# Patient Record
Sex: FEMALE | Race: WHITE | NOT HISPANIC OR LATINO | Employment: OTHER | URBAN - METROPOLITAN AREA
[De-identification: names, ages, dates, MRNs, and addresses within clinical notes are randomized per-mention and may not be internally consistent; named-entity substitution may affect disease eponyms.]

---

## 2017-12-28 ENCOUNTER — HOSPITAL ENCOUNTER (OUTPATIENT)
Dept: RADIOLOGY | Facility: MEDICAL CENTER | Age: 67
End: 2017-12-28
Attending: SPECIALIST
Payer: MEDICARE

## 2017-12-28 DIAGNOSIS — Z12.31 VISIT FOR SCREENING MAMMOGRAM: ICD-10-CM

## 2017-12-28 PROCEDURE — G0202 SCR MAMMO BI INCL CAD: HCPCS

## 2018-07-26 ENCOUNTER — APPOINTMENT (RX ONLY)
Dept: URBAN - METROPOLITAN AREA CLINIC 4 | Facility: CLINIC | Age: 68
Setting detail: DERMATOLOGY
End: 2018-07-26

## 2018-07-26 DIAGNOSIS — L82.0 INFLAMED SEBORRHEIC KERATOSIS: ICD-10-CM

## 2018-07-26 DIAGNOSIS — D18.0 HEMANGIOMA: ICD-10-CM

## 2018-07-26 DIAGNOSIS — L57.8 OTHER SKIN CHANGES DUE TO CHRONIC EXPOSURE TO NONIONIZING RADIATION: ICD-10-CM

## 2018-07-26 DIAGNOSIS — D22 MELANOCYTIC NEVI: ICD-10-CM

## 2018-07-26 DIAGNOSIS — Z85.828 PERSONAL HISTORY OF OTHER MALIGNANT NEOPLASM OF SKIN: ICD-10-CM

## 2018-07-26 DIAGNOSIS — L81.4 OTHER MELANIN HYPERPIGMENTATION: ICD-10-CM

## 2018-07-26 DIAGNOSIS — L82.1 OTHER SEBORRHEIC KERATOSIS: ICD-10-CM

## 2018-07-26 PROBLEM — D18.01 HEMANGIOMA OF SKIN AND SUBCUTANEOUS TISSUE: Status: ACTIVE | Noted: 2018-07-26

## 2018-07-26 PROBLEM — D22.5 MELANOCYTIC NEVI OF TRUNK: Status: ACTIVE | Noted: 2018-07-26

## 2018-07-26 PROCEDURE — 99202 OFFICE O/P NEW SF 15 MIN: CPT | Mod: 25

## 2018-07-26 PROCEDURE — 17110 DESTRUCTION B9 LES UP TO 14: CPT

## 2018-07-26 PROCEDURE — ? LIQUID NITROGEN

## 2018-07-26 PROCEDURE — ? COUNSELING

## 2018-07-26 ASSESSMENT — LOCATION SIMPLE DESCRIPTION DERM
LOCATION SIMPLE: RIGHT ANTERIOR NECK
LOCATION SIMPLE: LEFT UPPER BACK
LOCATION SIMPLE: LEFT LOWER BACK
LOCATION SIMPLE: LEFT HAND
LOCATION SIMPLE: RIGHT CHEEK
LOCATION SIMPLE: RIGHT HAND
LOCATION SIMPLE: RIGHT UPPER BACK
LOCATION SIMPLE: LEFT POSTERIOR UPPER ARM

## 2018-07-26 ASSESSMENT — LOCATION DETAILED DESCRIPTION DERM
LOCATION DETAILED: LEFT RADIAL DORSAL HAND
LOCATION DETAILED: LEFT INFERIOR MEDIAL MIDBACK
LOCATION DETAILED: RIGHT INFERIOR MEDIAL UPPER BACK
LOCATION DETAILED: RIGHT INFERIOR ANTERIOR NECK
LOCATION DETAILED: LEFT PROXIMAL POSTERIOR UPPER ARM
LOCATION DETAILED: RIGHT RADIAL DORSAL HAND
LOCATION DETAILED: RIGHT INFERIOR CENTRAL MALAR CHEEK
LOCATION DETAILED: RIGHT SUPERIOR UPPER BACK
LOCATION DETAILED: LEFT SUPERIOR UPPER BACK

## 2018-07-26 ASSESSMENT — LOCATION ZONE DERM
LOCATION ZONE: TRUNK
LOCATION ZONE: ARM
LOCATION ZONE: HAND
LOCATION ZONE: NECK
LOCATION ZONE: FACE

## 2018-07-26 NOTE — PROCEDURE: LIQUID NITROGEN
Consent: The patient's consent was obtained including but not limited to risks of crusting, scabbing, blistering, scarring, darker or lighter pigmentary change, recurrence, incomplete removal and infection.
Aperture Size (Optional): C
Detail Level: Detailed
Post-Care Instructions: I reviewed with the patient in detail post-care instructions. Patient is to wear sunprotection, and avoid picking at any of the treated lesions. Pt may apply Vaseline to crusted or scabbing areas.
Add 52 Modifier (Optional): no
Number Of Freeze-Thaw Cycles: 1 freeze-thaw cycle
Medical Necessity Information: It is in your best interest to select a reason for this procedure from the list below. All of these items fulfill various CMS LCD requirements except the new and changing color options.
Medical Necessity Clause: This procedure was medically necessary because the lesions that were treated were:
Duration Of Freeze Thaw-Cycle (Seconds): 3

## 2019-07-31 ENCOUNTER — HOSPITAL ENCOUNTER (OUTPATIENT)
Dept: RADIOLOGY | Facility: MEDICAL CENTER | Age: 69
End: 2019-07-31
Attending: INTERNAL MEDICINE
Payer: MEDICARE

## 2019-07-31 DIAGNOSIS — Z13.6 SCREENING FOR ABDOMINAL AORTIC ANEURYSM: ICD-10-CM

## 2019-07-31 DIAGNOSIS — I65.29 STENOSIS OF CAROTID ARTERY, UNSPECIFIED LATERALITY: ICD-10-CM

## 2019-07-31 PROCEDURE — 93978 VASCULAR STUDY: CPT

## 2019-07-31 PROCEDURE — 93880 EXTRACRANIAL BILAT STUDY: CPT | Mod: 26 | Performed by: INTERNAL MEDICINE

## 2019-07-31 PROCEDURE — 93880 EXTRACRANIAL BILAT STUDY: CPT

## 2019-07-31 PROCEDURE — 93978 VASCULAR STUDY: CPT | Mod: 26 | Performed by: INTERNAL MEDICINE

## 2019-08-20 ENCOUNTER — APPOINTMENT (RX ONLY)
Dept: URBAN - METROPOLITAN AREA CLINIC 4 | Facility: CLINIC | Age: 69
Setting detail: DERMATOLOGY
End: 2019-08-20

## 2019-08-20 DIAGNOSIS — Z85.828 PERSONAL HISTORY OF OTHER MALIGNANT NEOPLASM OF SKIN: ICD-10-CM

## 2019-08-20 DIAGNOSIS — L98.8 OTHER SPECIFIED DISORDERS OF THE SKIN AND SUBCUTANEOUS TISSUE: ICD-10-CM

## 2019-08-20 DIAGNOSIS — D18.0 HEMANGIOMA: ICD-10-CM

## 2019-08-20 DIAGNOSIS — L57.8 OTHER SKIN CHANGES DUE TO CHRONIC EXPOSURE TO NONIONIZING RADIATION: ICD-10-CM

## 2019-08-20 DIAGNOSIS — L57.0 ACTINIC KERATOSIS: ICD-10-CM

## 2019-08-20 DIAGNOSIS — L82.0 INFLAMED SEBORRHEIC KERATOSIS: ICD-10-CM

## 2019-08-20 DIAGNOSIS — L81.4 OTHER MELANIN HYPERPIGMENTATION: ICD-10-CM

## 2019-08-20 DIAGNOSIS — D22 MELANOCYTIC NEVI: ICD-10-CM

## 2019-08-20 DIAGNOSIS — L82.1 OTHER SEBORRHEIC KERATOSIS: ICD-10-CM

## 2019-08-20 PROBLEM — M12.9 ARTHROPATHY, UNSPECIFIED: Status: ACTIVE | Noted: 2019-08-20

## 2019-08-20 PROBLEM — D22.5 MELANOCYTIC NEVI OF TRUNK: Status: ACTIVE | Noted: 2019-08-20

## 2019-08-20 PROBLEM — D18.01 HEMANGIOMA OF SKIN AND SUBCUTANEOUS TISSUE: Status: ACTIVE | Noted: 2019-08-20

## 2019-08-20 PROCEDURE — ? ADDITIONAL NOTES

## 2019-08-20 PROCEDURE — 17003 DESTRUCT PREMALG LES 2-14: CPT | Mod: 59

## 2019-08-20 PROCEDURE — 17000 DESTRUCT PREMALG LESION: CPT | Mod: 59

## 2019-08-20 PROCEDURE — 17110 DESTRUCTION B9 LES UP TO 14: CPT

## 2019-08-20 PROCEDURE — 99214 OFFICE O/P EST MOD 30 MIN: CPT | Mod: 25

## 2019-08-20 PROCEDURE — ? LIQUID NITROGEN

## 2019-08-20 PROCEDURE — ? COUNSELING

## 2019-08-20 ASSESSMENT — LOCATION DETAILED DESCRIPTION DERM
LOCATION DETAILED: LEFT NASAL DORSUM
LOCATION DETAILED: RIGHT RADIAL DORSAL HAND
LOCATION DETAILED: RIGHT INFERIOR CENTRAL MALAR CHEEK
LOCATION DETAILED: RIGHT SUPERIOR UPPER BACK
LOCATION DETAILED: NASAL DORSUM
LOCATION DETAILED: LEFT SUPERIOR UPPER BACK
LOCATION DETAILED: LEFT POSTERIOR AXILLA
LOCATION DETAILED: RIGHT INFERIOR MEDIAL UPPER BACK
LOCATION DETAILED: RIGHT INFERIOR VERMILION LIP
LOCATION DETAILED: LEFT RADIAL DORSAL HAND
LOCATION DETAILED: UPPER STERNUM
LOCATION DETAILED: LEFT RIB CAGE
LOCATION DETAILED: RIGHT INFERIOR ANTERIOR NECK

## 2019-08-20 ASSESSMENT — LOCATION ZONE DERM
LOCATION ZONE: FACE
LOCATION ZONE: NECK
LOCATION ZONE: NOSE
LOCATION ZONE: LIP
LOCATION ZONE: AXILLAE
LOCATION ZONE: HAND
LOCATION ZONE: TRUNK

## 2019-08-20 ASSESSMENT — LOCATION SIMPLE DESCRIPTION DERM
LOCATION SIMPLE: RIGHT UPPER BACK
LOCATION SIMPLE: ABDOMEN
LOCATION SIMPLE: LEFT HAND
LOCATION SIMPLE: CHEST
LOCATION SIMPLE: NOSE
LOCATION SIMPLE: RIGHT HAND
LOCATION SIMPLE: LEFT UPPER BACK
LOCATION SIMPLE: LEFT POSTERIOR AXILLA
LOCATION SIMPLE: RIGHT ANTERIOR NECK
LOCATION SIMPLE: RIGHT LIP
LOCATION SIMPLE: RIGHT CHEEK

## 2019-08-20 NOTE — PROCEDURE: LIQUID NITROGEN
Detail Level: Detailed
Render Note In Bullet Format When Appropriate: No
Aperture Size (Optional): C
Post-Care Instructions: I reviewed with the patient in detail post-care instructions. Patient is to wear sunprotection, and avoid picking at any of the treated lesions. Pt may apply Vaseline to crusted or scabbing areas.
Duration Of Freeze Thaw-Cycle (Seconds): 3
Render Post-Care Instructions In Note?: yes
Number Of Freeze-Thaw Cycles: 2 freeze-thaw cycles
Consent: The patient's consent was obtained including but not limited to risks of crusting, scabbing, blistering, scarring, darker or lighter pigmentary change, recurrence, incomplete removal and infection.
Medical Necessity Clause: This procedure was medically necessary because the lesions that were treated were:
Medical Necessity Information: It is in your best interest to select a reason for this procedure from the list below. All of these items fulfill various CMS LCD requirements except the new and changing color options.
Number Of Freeze-Thaw Cycles: 1 freeze-thaw cycle

## 2019-09-09 ENCOUNTER — HOSPITAL ENCOUNTER (OUTPATIENT)
Dept: RADIOLOGY | Facility: MEDICAL CENTER | Age: 69
End: 2019-09-09
Attending: INTERNAL MEDICINE
Payer: MEDICARE

## 2019-09-09 DIAGNOSIS — Z12.31 SCREENING MAMMOGRAM, ENCOUNTER FOR: ICD-10-CM

## 2019-09-09 PROCEDURE — 77063 BREAST TOMOSYNTHESIS BI: CPT

## 2019-11-08 ENCOUNTER — HOSPITAL ENCOUNTER (OUTPATIENT)
Dept: LAB | Facility: MEDICAL CENTER | Age: 69
End: 2019-11-08
Attending: INTERNAL MEDICINE
Payer: MEDICARE

## 2019-11-08 LAB
ALBUMIN SERPL BCP-MCNC: 4.3 G/DL (ref 3.2–4.9)
ALBUMIN/GLOB SERPL: 1.6 G/DL
ALP SERPL-CCNC: 71 U/L (ref 30–99)
ALT SERPL-CCNC: 15 U/L (ref 2–50)
ANION GAP SERPL CALC-SCNC: 7 MMOL/L (ref 0–11.9)
AST SERPL-CCNC: 17 U/L (ref 12–45)
BILIRUB SERPL-MCNC: 0.8 MG/DL (ref 0.1–1.5)
BUN SERPL-MCNC: 24 MG/DL (ref 8–22)
CALCIUM SERPL-MCNC: 9.3 MG/DL (ref 8.5–10.5)
CHLORIDE SERPL-SCNC: 103 MMOL/L (ref 96–112)
CHOLEST SERPL-MCNC: 189 MG/DL (ref 100–199)
CO2 SERPL-SCNC: 29 MMOL/L (ref 20–33)
CREAT SERPL-MCNC: 0.95 MG/DL (ref 0.5–1.4)
EST. AVERAGE GLUCOSE BLD GHB EST-MCNC: 105 MG/DL
GLOBULIN SER CALC-MCNC: 2.7 G/DL (ref 1.9–3.5)
GLUCOSE SERPL-MCNC: 87 MG/DL (ref 65–99)
HBA1C MFR BLD: 5.3 % (ref 0–5.6)
HDLC SERPL-MCNC: 56 MG/DL
LDLC SERPL CALC-MCNC: 116 MG/DL
POTASSIUM SERPL-SCNC: 4.8 MMOL/L (ref 3.6–5.5)
PROT SERPL-MCNC: 7 G/DL (ref 6–8.2)
SODIUM SERPL-SCNC: 139 MMOL/L (ref 135–145)
TRIGL SERPL-MCNC: 85 MG/DL (ref 0–149)

## 2019-11-08 PROCEDURE — 83036 HEMOGLOBIN GLYCOSYLATED A1C: CPT

## 2019-11-08 PROCEDURE — 80053 COMPREHEN METABOLIC PANEL: CPT

## 2019-11-08 PROCEDURE — 80061 LIPID PANEL: CPT

## 2020-02-19 ENCOUNTER — APPOINTMENT (RX ONLY)
Dept: URBAN - METROPOLITAN AREA CLINIC 4 | Facility: CLINIC | Age: 70
Setting detail: DERMATOLOGY
End: 2020-02-19

## 2020-02-19 DIAGNOSIS — L81.4 OTHER MELANIN HYPERPIGMENTATION: ICD-10-CM

## 2020-02-19 DIAGNOSIS — L57.0 ACTINIC KERATOSIS: ICD-10-CM

## 2020-02-19 DIAGNOSIS — Z41.9 ENCOUNTER FOR PROCEDURE FOR PURPOSES OTHER THAN REMEDYING HEALTH STATE, UNSPECIFIED: ICD-10-CM

## 2020-02-19 DIAGNOSIS — L82.1 OTHER SEBORRHEIC KERATOSIS: ICD-10-CM

## 2020-02-19 DIAGNOSIS — L98.8 OTHER SPECIFIED DISORDERS OF THE SKIN AND SUBCUTANEOUS TISSUE: ICD-10-CM

## 2020-02-19 DIAGNOSIS — D22 MELANOCYTIC NEVI: ICD-10-CM

## 2020-02-19 DIAGNOSIS — D18.0 HEMANGIOMA: ICD-10-CM

## 2020-02-19 DIAGNOSIS — L82.0 INFLAMED SEBORRHEIC KERATOSIS: ICD-10-CM

## 2020-02-19 DIAGNOSIS — L57.8 OTHER SKIN CHANGES DUE TO CHRONIC EXPOSURE TO NONIONIZING RADIATION: ICD-10-CM

## 2020-02-19 DIAGNOSIS — Z85.828 PERSONAL HISTORY OF OTHER MALIGNANT NEOPLASM OF SKIN: ICD-10-CM

## 2020-02-19 DIAGNOSIS — L70.8 OTHER ACNE: ICD-10-CM

## 2020-02-19 PROBLEM — D48.5 NEOPLASM OF UNCERTAIN BEHAVIOR OF SKIN: Status: ACTIVE | Noted: 2020-02-19

## 2020-02-19 PROBLEM — D18.01 HEMANGIOMA OF SKIN AND SUBCUTANEOUS TISSUE: Status: ACTIVE | Noted: 2020-02-19

## 2020-02-19 PROBLEM — D22.5 MELANOCYTIC NEVI OF TRUNK: Status: ACTIVE | Noted: 2020-02-19

## 2020-02-19 PROCEDURE — 99213 OFFICE O/P EST LOW 20 MIN: CPT | Mod: 25

## 2020-02-19 PROCEDURE — 17000 DESTRUCT PREMALG LESION: CPT | Mod: 59

## 2020-02-19 PROCEDURE — 17110 DESTRUCTION B9 LES UP TO 14: CPT

## 2020-02-19 PROCEDURE — 11102 TANGNTL BX SKIN SINGLE LES: CPT | Mod: 59

## 2020-02-19 PROCEDURE — ? PRESCRIPTION

## 2020-02-19 PROCEDURE — 17003 DESTRUCT PREMALG LES 2-14: CPT | Mod: 59

## 2020-02-19 PROCEDURE — ? COUNSELING

## 2020-02-19 PROCEDURE — ? ADDITIONAL NOTES

## 2020-02-19 PROCEDURE — ? BIOPSY BY SHAVE METHOD

## 2020-02-19 PROCEDURE — ? PHOTO-DOCUMENTATION

## 2020-02-19 PROCEDURE — ? LIQUID NITROGEN

## 2020-02-19 RX ORDER — TRETIONIN 0.25 MG/G
CREAM TOPICAL
Qty: 1 | Refills: 3 | Status: ERX | COMMUNITY
Start: 2020-02-19

## 2020-02-19 RX ADMIN — TRETIONIN 1: 0.25 CREAM TOPICAL at 00:00

## 2020-02-19 ASSESSMENT — LOCATION SIMPLE DESCRIPTION DERM
LOCATION SIMPLE: LEFT UPPER BACK
LOCATION SIMPLE: RIGHT ANTERIOR NECK
LOCATION SIMPLE: RIGHT LIP
LOCATION SIMPLE: RIGHT UPPER BACK
LOCATION SIMPLE: RIGHT HAND
LOCATION SIMPLE: CHEST
LOCATION SIMPLE: LEFT CHEEK
LOCATION SIMPLE: RIGHT BREAST
LOCATION SIMPLE: RIGHT CHEEK
LOCATION SIMPLE: LEFT HAND

## 2020-02-19 ASSESSMENT — LOCATION DETAILED DESCRIPTION DERM
LOCATION DETAILED: RIGHT INFERIOR CENTRAL MALAR CHEEK
LOCATION DETAILED: RIGHT SUPERIOR LATERAL BUCCAL CHEEK
LOCATION DETAILED: LEFT SUPERIOR UPPER BACK
LOCATION DETAILED: RIGHT MEDIAL SUPERIOR CHEST
LOCATION DETAILED: RIGHT INFERIOR LATERAL MALAR CHEEK
LOCATION DETAILED: LEFT CENTRAL MALAR CHEEK
LOCATION DETAILED: LEFT RADIAL DORSAL HAND
LOCATION DETAILED: RIGHT INFERIOR MEDIAL UPPER BACK
LOCATION DETAILED: RIGHT MEDIAL BREAST 1-2:00 REGION
LOCATION DETAILED: MIDDLE STERNUM
LOCATION DETAILED: RIGHT LATERAL MALAR CHEEK
LOCATION DETAILED: LEFT MEDIAL SUPERIOR CHEST
LOCATION DETAILED: RIGHT SUPERIOR UPPER BACK
LOCATION DETAILED: RIGHT RADIAL DORSAL HAND
LOCATION DETAILED: LEFT INFERIOR CENTRAL MALAR CHEEK
LOCATION DETAILED: UPPER STERNUM
LOCATION DETAILED: RIGHT INFERIOR VERMILION LIP
LOCATION DETAILED: RIGHT INFERIOR ANTERIOR NECK

## 2020-02-19 ASSESSMENT — LOCATION ZONE DERM
LOCATION ZONE: NECK
LOCATION ZONE: TRUNK
LOCATION ZONE: LIP
LOCATION ZONE: HAND
LOCATION ZONE: FACE

## 2020-02-19 NOTE — PROCEDURE: LIQUID NITROGEN
Detail Level: Simple
Render Post-Care Instructions In Note?: no
Aperture Size (Optional): C
Duration Of Freeze Thaw-Cycle (Seconds): 3
Number Of Freeze-Thaw Cycles: 2 freeze-thaw cycles
Post-Care Instructions: I reviewed with the patient in detail post-care instructions. Patient is to wear sunprotection, and avoid picking at any of the treated lesions. Pt may apply Vaseline to crusted or scabbing areas.
Consent: The patient's consent was obtained including but not limited to risks of crusting, scabbing, blistering, scarring, darker or lighter pigmentary change, recurrence, incomplete removal and infection.
Medical Necessity Clause: This procedure was medically necessary because the lesions that were treated were:
Number Of Freeze-Thaw Cycles: 1 freeze-thaw cycle
Medical Necessity Information: It is in your best interest to select a reason for this procedure from the list below. All of these items fulfill various CMS LCD requirements except the new and changing color options.
Detail Level: Detailed

## 2020-02-19 NOTE — PROCEDURE: ADDITIONAL NOTES
Additional Notes: Discussed KANWAL Irwin
Detail Level: Simple
Additional Notes: Discuss VBeam treatment

## 2020-08-19 ENCOUNTER — APPOINTMENT (RX ONLY)
Dept: URBAN - METROPOLITAN AREA CLINIC 4 | Facility: CLINIC | Age: 70
Setting detail: DERMATOLOGY
End: 2020-08-19

## 2020-08-19 DIAGNOSIS — Z85.828 PERSONAL HISTORY OF OTHER MALIGNANT NEOPLASM OF SKIN: ICD-10-CM

## 2020-08-19 DIAGNOSIS — D18.0 HEMANGIOMA: ICD-10-CM

## 2020-08-19 DIAGNOSIS — D22 MELANOCYTIC NEVI: ICD-10-CM

## 2020-08-19 DIAGNOSIS — L57.0 ACTINIC KERATOSIS: ICD-10-CM

## 2020-08-19 DIAGNOSIS — L82.1 OTHER SEBORRHEIC KERATOSIS: ICD-10-CM

## 2020-08-19 DIAGNOSIS — L57.8 OTHER SKIN CHANGES DUE TO CHRONIC EXPOSURE TO NONIONIZING RADIATION: ICD-10-CM

## 2020-08-19 DIAGNOSIS — L81.4 OTHER MELANIN HYPERPIGMENTATION: ICD-10-CM

## 2020-08-19 DIAGNOSIS — M71 OTHER BURSOPATHIES: ICD-10-CM

## 2020-08-19 PROBLEM — M71.342 OTHER BURSAL CYST, LEFT HAND: Status: ACTIVE | Noted: 2020-08-19

## 2020-08-19 PROBLEM — D22.5 MELANOCYTIC NEVI OF TRUNK: Status: ACTIVE | Noted: 2020-08-19

## 2020-08-19 PROBLEM — D18.01 HEMANGIOMA OF SKIN AND SUBCUTANEOUS TISSUE: Status: ACTIVE | Noted: 2020-08-19

## 2020-08-19 PROCEDURE — 99213 OFFICE O/P EST LOW 20 MIN: CPT | Mod: 25

## 2020-08-19 PROCEDURE — 17000 DESTRUCT PREMALG LESION: CPT

## 2020-08-19 PROCEDURE — ? COUNSELING

## 2020-08-19 PROCEDURE — ? LIQUID NITROGEN

## 2020-08-19 ASSESSMENT — LOCATION DETAILED DESCRIPTION DERM
LOCATION DETAILED: LEFT SUPERIOR UPPER BACK
LOCATION DETAILED: LEFT DISTAL DORSAL RING FINGER
LOCATION DETAILED: RIGHT CENTRAL MALAR CHEEK
LOCATION DETAILED: RIGHT INFERIOR CENTRAL MALAR CHEEK
LOCATION DETAILED: RIGHT INFERIOR ANTERIOR NECK
LOCATION DETAILED: LEFT RADIAL DORSAL HAND
LOCATION DETAILED: RIGHT SUPERIOR UPPER BACK
LOCATION DETAILED: RIGHT INFERIOR MEDIAL UPPER BACK
LOCATION DETAILED: RIGHT RADIAL DORSAL HAND

## 2020-08-19 ASSESSMENT — LOCATION ZONE DERM
LOCATION ZONE: NECK
LOCATION ZONE: HAND
LOCATION ZONE: FINGER
LOCATION ZONE: TRUNK
LOCATION ZONE: FACE

## 2020-08-19 ASSESSMENT — LOCATION SIMPLE DESCRIPTION DERM
LOCATION SIMPLE: RIGHT CHEEK
LOCATION SIMPLE: LEFT HAND
LOCATION SIMPLE: RIGHT UPPER BACK
LOCATION SIMPLE: LEFT UPPER BACK
LOCATION SIMPLE: LEFT RING FINGER
LOCATION SIMPLE: RIGHT HAND
LOCATION SIMPLE: RIGHT ANTERIOR NECK

## 2020-08-19 NOTE — PROCEDURE: LIQUID NITROGEN
Detail Level: Simple
Render Post-Care Instructions In Note?: no
Post-Care Instructions: I reviewed with the patient in detail post-care instructions. Patient is to wear sunprotection, and avoid picking at any of the treated lesions. Pt may apply Vaseline to crusted or scabbing areas.
Consent: The patient's consent was obtained including but not limited to risks of crusting, scabbing, blistering, scarring, darker or lighter pigmentary change, recurrence, incomplete removal and infection.
Aperture Size (Optional): C
Duration Of Freeze Thaw-Cycle (Seconds): 3
Number Of Freeze-Thaw Cycles: 2 freeze-thaw cycles

## 2020-08-31 ENCOUNTER — OFFICE VISIT (OUTPATIENT)
Dept: CARDIOLOGY | Facility: MEDICAL CENTER | Age: 70
End: 2020-08-31
Payer: MEDICARE

## 2020-08-31 VITALS
SYSTOLIC BLOOD PRESSURE: 130 MMHG | BODY MASS INDEX: 24.16 KG/M2 | OXYGEN SATURATION: 96 % | HEART RATE: 66 BPM | DIASTOLIC BLOOD PRESSURE: 70 MMHG | HEIGHT: 65 IN | WEIGHT: 145 LBS

## 2020-08-31 DIAGNOSIS — R00.2 PALPITATIONS: ICD-10-CM

## 2020-08-31 LAB — EKG IMPRESSION: NORMAL

## 2020-08-31 PROCEDURE — 99204 OFFICE O/P NEW MOD 45 MIN: CPT | Performed by: INTERNAL MEDICINE

## 2020-08-31 PROCEDURE — 93000 ELECTROCARDIOGRAM COMPLETE: CPT | Performed by: INTERNAL MEDICINE

## 2020-08-31 RX ORDER — FAMOTIDINE 20 MG/1
20 TABLET, FILM COATED ORAL 2 TIMES DAILY
COMMUNITY

## 2020-08-31 RX ORDER — CITALOPRAM HYDROBROMIDE 10 MG/1
10 TABLET ORAL DAILY
COMMUNITY
End: 2022-01-01

## 2020-08-31 NOTE — PROGRESS NOTES
Arrhythmia Clinic Note (New patient)     DOS: 8/31/2020    Referring physician: Dr Latham    Chief complaint/Reason for consult: Palpitations    HPI: 70-year-old female with a history of anxiety presenting for evaluation of palpitations.  She says that once in a while, she will feel onset of rapid palpitations which will last seconds to minutes.  This happens often in the middle the night and awakes her from sleep.  She denies chest pain, shortness of breath on exertion.  She denies syncope or presyncope.  She does have significant anxiety.  She has gastroesophageal reflux disease as well.  She is wondering if her reflux is causing these palpitations.  She is also wondering if her prior history of gallbladder removal could absolutely do with this.  She is saying that she has a strong family history of good heart health, but is very concerned if she has something that she should worry about.  She was told she had a murmur many years ago as well.    ROS (+ highlighted in bold):  Constitutional: Fevers/chills/fatigue/weightloss  HEENT: Blurry vision/eye pain/sore throat/hearing loss  Respiratory: Shortness of breath/cough  Cardiovascular: Chest pain/palpitations/edema/orthopnea/syncope  GI: Nausea/vomitting/diarrhea  MSK: Arthralgias/myagias/muscle weakness  Skin: Rash/sores  Neurological: Numbness/tremors/vertigo  Endocrine: Excessive thirst/polyuria/cold intolerance/heat intolerance  Psych: Depression/anxiety    Past Medical History:   Diagnosis Date   • Arrhythmia    • Cancer (HCC)     squamous cell carcinoma removed   • Heart murmur        Past Surgical History:   Procedure Laterality Date   • HYSTERECTOMY ROBOTIC  11/12/2009    Performed by GEORGES CRUMP at SURGERY [facility]S   • CYSTOSCOPY  11/12/2009    Performed by GEORGES CRUMP at SURGERY [facility]   • CHOLECYSTECTOMY     • OTHER ORTHOPEDIC SURGERY      rt ankle fx       Social History     Socioeconomic History   • Marital status: Single      Spouse name: Not on file   • Number of children: Not on file   • Years of education: Not on file   • Highest education level: Not on file   Occupational History   • Not on file   Social Needs   • Financial resource strain: Not on file   • Food insecurity     Worry: Not on file     Inability: Not on file   • Transportation needs     Medical: Not on file     Non-medical: Not on file   Tobacco Use   • Smoking status: Former Smoker   • Smokeless tobacco: Never Used   • Tobacco comment: 1 ppd, 10 yrs   Substance and Sexual Activity   • Alcohol use: Yes   • Drug use: No   • Sexual activity: Not on file   Lifestyle   • Physical activity     Days per week: Not on file     Minutes per session: Not on file   • Stress: Not on file   Relationships   • Social connections     Talks on phone: Not on file     Gets together: Not on file     Attends [employer] service: Not on file     Active member of club or organization: Not on file     Attends meetings of clubs or organizations: Not on file     Relationship status: Not on file   • Intimate partner violence     Fear of current or ex partner: Not on file     Emotionally abused: Not on file     Physically abused: Not on file     Forced sexual activity: Not on file   Other Topics Concern   • Not on file   Social History Narrative   • Not on file       No family history on file.   No family history of sudden cardiac death, father lived to 100 years old    No Known Allergies    Current Outpatient Medications   Medication Sig Dispense Refill   • citalopram (CELEXA) 10 MG tablet Take 10 mg by mouth every day.     • famotidine (PEPCID) 20 MG Tab Take 20 mg by mouth 2 times a day.     • oxycodone immediate-release (ROXICODONE) 5 MG Tab Take 1 Tab by mouth every four hours as needed. (Patient not taking: Reported on 8/31/2020) 30 Tab 0   • ondansetron (ZOFRAN ODT) 4 MG TABLET DISPERSIBLE Take 1 Tab by mouth every four hours as needed for Nausea/Vomiting. (Patient not taking: Reported on  "8/31/2020) 30 Tab 0   • guaifenesin LA (MUCINEX) 600 MG TABLET SR 12 HR Take 1 Tab by mouth every 12 hours. (Patient not taking: Reported on 8/31/2020) 30 Tab 0   • guaifenesin DM (ROBITUSSIN DM) 100-10 MG/5ML Syrup Take 10 mL by mouth every 6 hours as needed for Cough. (Patient not taking: Reported on 8/31/2020) 560 mL 1   • Calcium-Vitamin D-Vitamin K (CALCIUM + D + K PO) Take  by mouth.       No current facility-administered medications for this visit.        Physical Exam:  Vitals:    08/31/20 0926   BP: 130/70   BP Location: Left arm   Patient Position: Sitting   BP Cuff Size: Adult   Pulse: 66   SpO2: 96%   Weight: 65.8 kg (145 lb)   Height: 1.651 m (5' 5\")     General appearance: NAD, conversant   Eyes: anicteric sclerae, moist conjunctivae; no lid-lag; PERRLA  HENT: Atraumatic; oropharynx clear with moist mucous membranes and no mucosal ulcerations; normal hard and soft palate  Neck: Trachea midline; FROM, supple, no thyromegaly or lymphadenopathy  Lungs: CTA, with normal respiratory effort and no intercostal retractions  CV: RRR, no MRGs, no JVD   Abdomen: Soft, non-tender; no masses or HSM  Extremities: No peripheral edema or extremity lymphadenopathy  Skin: Normal temperature, turgor and texture; no rash, ulcers or subcutaneous nodules  Psych: Appropriate affect, alert and oriented to person, place and time    Data:  Lipids:   Lab Results   Component Value Date/Time    CHOLSTRLTOT 189 11/08/2019 09:30 AM    TRIGLYCERIDE 85 11/08/2019 09:30 AM    HDL 56 11/08/2019 09:30 AM     (H) 11/08/2019 09:30 AM        BMP:  Lab Results   Component Value Date/Time    SODIUM 139 11/08/2019 0930    POTASSIUM 4.8 11/08/2019 0930    CHLORIDE 103 11/08/2019 0930    CO2 29 11/08/2019 0930    GLUCOSE 87 11/08/2019 0930    BUN 24 (H) 11/08/2019 0930    CREATININE 0.95 11/08/2019 0930    CALCIUM 9.3 11/08/2019 0930    ANION 7.0 11/08/2019 0930        TSH:   Lab Results   Component Value Date/Time    TSHULTRASEN 2.170 " 11/08/2015 0230        THYROXINE (T4):   No results found for: NEENAIR     CBC:   Lab Results   Component Value Date/Time    WBC 6.4 11/08/2015 02:30 AM    RBC 3.90 (L) 11/08/2015 02:30 AM    HEMOGLOBIN 11.7 (L) 11/08/2015 02:30 AM    HEMATOCRIT 34.4 (L) 11/08/2015 02:30 AM    MCV 88.2 11/08/2015 02:30 AM    MCH 30.0 11/08/2015 02:30 AM    MCHC 34.0 11/08/2015 02:30 AM    RDW 40.6 11/08/2015 02:30 AM    PLATELETCT 171 11/08/2015 02:30 AM    MPV 10.9 11/08/2015 02:30 AM    NEUTSPOLYS 72.10 (H) 11/07/2015 03:07 AM    LYMPHOCYTES 16.40 (L) 11/07/2015 03:07 AM    MONOCYTES 10.10 11/07/2015 03:07 AM    EOSINOPHILS 0.80 11/07/2015 03:07 AM    BASOPHILS 0.30 11/07/2015 03:07 AM    IMMGRAN 0.30 11/07/2015 03:07 AM    NRBC 0.00 11/07/2015 03:07 AM    NEUTS 5.58 11/07/2015 03:07 AM    LYMPHS 1.27 11/07/2015 03:07 AM    MONOS 0.78 11/07/2015 03:07 AM    EOS 0.06 11/07/2015 03:07 AM    BASO 0.02 11/07/2015 03:07 AM    IMMGRANAB 0.02 11/07/2015 03:07 AM    NRBCAB 0.00 11/07/2015 03:07 AM        CBC w/o DIFF  Lab Results   Component Value Date/Time    WBC 6.4 11/08/2015 02:30 AM    RBC 3.90 (L) 11/08/2015 02:30 AM    HEMOGLOBIN 11.7 (L) 11/08/2015 02:30 AM    MCV 88.2 11/08/2015 02:30 AM    MCH 30.0 11/08/2015 02:30 AM    MCHC 34.0 11/08/2015 02:30 AM    RDW 40.6 11/08/2015 02:30 AM    MPV 10.9 11/08/2015 02:30 AM     Echocardiogram 2009: Normal LV function, mild to moderate mitral valve regurgitation    EKG interpreted by me: Prior EKG in 2017 showed normal sinus rhythm    Impression/Plan:  1.  Palpitations  2.  Mild to moderate mitral regurgitation    -We will order 3-day bio tell to assess for palpitations  -Recheck echocardiogram given history of mild to moderate mitral vegetation 11 years ago    Follow-up as needed pending any abnormal testing results      Darren Hamilton MD  Cardiac Electrophysiology

## 2020-09-23 ENCOUNTER — HOSPITAL ENCOUNTER (OUTPATIENT)
Dept: CARDIOLOGY | Facility: MEDICAL CENTER | Age: 70
End: 2020-09-23
Attending: INTERNAL MEDICINE
Payer: MEDICARE

## 2020-09-23 DIAGNOSIS — R00.2 PALPITATIONS: ICD-10-CM

## 2020-09-23 LAB
LV EJECT FRACT  99904: 55
LV EJECT FRACT MOD 2C 99903: 45.61
LV EJECT FRACT MOD 4C 99902: 55.95
LV EJECT FRACT MOD BP 99901: 48.78

## 2020-09-23 PROCEDURE — 93306 TTE W/DOPPLER COMPLETE: CPT

## 2020-09-23 PROCEDURE — 93306 TTE W/DOPPLER COMPLETE: CPT | Mod: 26 | Performed by: INTERNAL MEDICINE

## 2021-01-04 ENCOUNTER — HOSPITAL ENCOUNTER (OUTPATIENT)
Dept: RADIOLOGY | Facility: MEDICAL CENTER | Age: 71
End: 2021-01-04
Attending: INTERNAL MEDICINE
Payer: MEDICARE

## 2021-01-04 DIAGNOSIS — Z12.31 VISIT FOR SCREENING MAMMOGRAM: ICD-10-CM

## 2021-01-04 PROCEDURE — 77063 BREAST TOMOSYNTHESIS BI: CPT

## 2021-01-12 ENCOUNTER — HOSPITAL ENCOUNTER (OUTPATIENT)
Dept: RADIOLOGY | Facility: MEDICAL CENTER | Age: 71
End: 2021-01-12
Attending: INTERNAL MEDICINE
Payer: MEDICARE

## 2021-01-12 DIAGNOSIS — R92.8 ABNORMAL MAMMOGRAM: ICD-10-CM

## 2021-01-12 PROCEDURE — G0279 TOMOSYNTHESIS, MAMMO: HCPCS | Mod: RT

## 2021-01-15 DIAGNOSIS — Z23 NEED FOR VACCINATION: ICD-10-CM

## 2021-04-05 ENCOUNTER — TELEPHONE (OUTPATIENT)
Dept: CARDIOLOGY | Facility: MEDICAL CENTER | Age: 71
End: 2021-04-05

## 2021-04-05 NOTE — TELEPHONE ENCOUNTER
MC    Patient called and would like to go over the results from her Echo done in September. She would also like to see if Holter monitor is still needed. Please call her at [phone].      Thank you,    Merline MOSER

## 2021-04-05 NOTE — TELEPHONE ENCOUNTER
Pt would like 30 biotel due to occasional palpations and intermitting feeling like indigestion/heart in throat.  Pt scheduled for end of April.   Discussed echo results all questions answer

## 2021-04-28 ENCOUNTER — NON-PROVIDER VISIT (OUTPATIENT)
Dept: CARDIOLOGY | Facility: MEDICAL CENTER | Age: 71
End: 2021-04-28
Payer: MEDICARE

## 2021-04-28 ENCOUNTER — TELEPHONE (OUTPATIENT)
Dept: CARDIOLOGY | Facility: MEDICAL CENTER | Age: 71
End: 2021-04-28

## 2021-04-28 DIAGNOSIS — R00.2 PALPITATIONS: ICD-10-CM

## 2021-04-28 DIAGNOSIS — I47.29 NSVT (NONSUSTAINED VENTRICULAR TACHYCARDIA) (HCC): ICD-10-CM

## 2021-04-28 PROCEDURE — 93268 ECG RECORD/REVIEW: CPT | Performed by: INTERNAL MEDICINE

## 2021-04-28 NOTE — TELEPHONE ENCOUNTER
Patient enrolled in the 30 day Bio-Tel MCT Heart monitoring program per Darren Hamilton MD.  >In-Clinic hookup with Baseline transmitted  >Pending EOS.

## 2021-06-01 DIAGNOSIS — R00.2 PALPITATIONS: ICD-10-CM

## 2021-06-03 NOTE — RESULT ENCOUNTER NOTE
Good news, your monitor did not show any evidence of concerning or sustained arrhythmias, and otherwise looked very normal.

## 2021-06-30 ENCOUNTER — HOSPITAL ENCOUNTER (OUTPATIENT)
Facility: MEDICAL CENTER | Age: 71
End: 2021-06-30
Attending: INTERNAL MEDICINE
Payer: MEDICARE

## 2021-06-30 PROCEDURE — 82274 ASSAY TEST FOR BLOOD FECAL: CPT

## 2021-07-02 ENCOUNTER — HOSPITAL ENCOUNTER (OUTPATIENT)
Dept: RADIOLOGY | Facility: MEDICAL CENTER | Age: 71
End: 2021-07-02
Attending: INTERNAL MEDICINE

## 2021-07-02 DIAGNOSIS — I65.23 ARTERIOSCLEROSIS OF BOTH CAROTID ARTERIES: ICD-10-CM

## 2021-07-05 LAB — IMM ASSAY OCC BLD FITOB: NEGATIVE

## 2021-07-13 ENCOUNTER — TELEPHONE (OUTPATIENT)
Dept: CARDIOLOGY | Facility: MEDICAL CENTER | Age: 71
End: 2021-07-13

## 2021-07-13 NOTE — TELEPHONE ENCOUNTER
CLAY    Pt called regarding 30 day BioTel monitor placed in office on 4/28 and results in on 6/1. Pt states that she never heard back regarding results and is wondering if someone can call to discuss at [phone]    Thank you

## 2021-08-03 ENCOUNTER — PATIENT MESSAGE (OUTPATIENT)
Dept: HEALTH INFORMATION MANAGEMENT | Facility: OTHER | Age: 71
End: 2021-08-03

## 2021-08-12 ENCOUNTER — APPOINTMENT (RX ONLY)
Dept: URBAN - METROPOLITAN AREA CLINIC 4 | Facility: CLINIC | Age: 71
Setting detail: DERMATOLOGY
End: 2021-08-12

## 2021-08-12 DIAGNOSIS — D18.0 HEMANGIOMA: ICD-10-CM

## 2021-08-12 DIAGNOSIS — L57.0 ACTINIC KERATOSIS: ICD-10-CM

## 2021-08-12 DIAGNOSIS — L81.4 OTHER MELANIN HYPERPIGMENTATION: ICD-10-CM

## 2021-08-12 DIAGNOSIS — Z85.828 PERSONAL HISTORY OF OTHER MALIGNANT NEOPLASM OF SKIN: ICD-10-CM

## 2021-08-12 DIAGNOSIS — L57.8 OTHER SKIN CHANGES DUE TO CHRONIC EXPOSURE TO NONIONIZING RADIATION: ICD-10-CM

## 2021-08-12 DIAGNOSIS — L70.8 OTHER ACNE: ICD-10-CM

## 2021-08-12 DIAGNOSIS — L82.1 OTHER SEBORRHEIC KERATOSIS: ICD-10-CM

## 2021-08-12 DIAGNOSIS — D22 MELANOCYTIC NEVI: ICD-10-CM

## 2021-08-12 DIAGNOSIS — L82.0 INFLAMED SEBORRHEIC KERATOSIS: ICD-10-CM

## 2021-08-12 PROBLEM — D22.5 MELANOCYTIC NEVI OF TRUNK: Status: ACTIVE | Noted: 2021-08-12

## 2021-08-12 PROBLEM — D18.01 HEMANGIOMA OF SKIN AND SUBCUTANEOUS TISSUE: Status: ACTIVE | Noted: 2021-08-12

## 2021-08-12 PROCEDURE — 17110 DESTRUCTION B9 LES UP TO 14: CPT

## 2021-08-12 PROCEDURE — 17003 DESTRUCT PREMALG LES 2-14: CPT | Mod: 59

## 2021-08-12 PROCEDURE — ? COUNSELING

## 2021-08-12 PROCEDURE — 17000 DESTRUCT PREMALG LESION: CPT | Mod: 59

## 2021-08-12 PROCEDURE — ? LIQUID NITROGEN

## 2021-08-12 PROCEDURE — 99213 OFFICE O/P EST LOW 20 MIN: CPT | Mod: 25

## 2021-08-12 PROCEDURE — ? PRESCRIPTION

## 2021-08-12 RX ORDER — TRETIONIN 0.25 MG/G
1 CREAM TOPICAL QHS
Qty: 1 | Refills: 3 | Status: ERX | COMMUNITY
Start: 2021-08-12

## 2021-08-12 RX ADMIN — TRETIONIN 1: 0.25 CREAM TOPICAL at 00:00

## 2021-08-12 ASSESSMENT — LOCATION SIMPLE DESCRIPTION DERM
LOCATION SIMPLE: RIGHT CHEEK
LOCATION SIMPLE: CHEST
LOCATION SIMPLE: LEFT UPPER ARM
LOCATION SIMPLE: RIGHT UPPER ARM
LOCATION SIMPLE: RIGHT FOREARM
LOCATION SIMPLE: LEFT THIGH
LOCATION SIMPLE: LEFT CHEEK
LOCATION SIMPLE: LEFT FOREARM
LOCATION SIMPLE: RIGHT KNEE
LOCATION SIMPLE: RIGHT THIGH
LOCATION SIMPLE: RIGHT UPPER BACK
LOCATION SIMPLE: NOSE

## 2021-08-12 ASSESSMENT — LOCATION ZONE DERM
LOCATION ZONE: NOSE
LOCATION ZONE: LEG
LOCATION ZONE: ARM
LOCATION ZONE: TRUNK
LOCATION ZONE: FACE

## 2021-08-12 ASSESSMENT — LOCATION DETAILED DESCRIPTION DERM
LOCATION DETAILED: LEFT INFERIOR CENTRAL MALAR CHEEK
LOCATION DETAILED: LEFT ANTERIOR DISTAL THIGH
LOCATION DETAILED: LEFT MEDIAL SUPERIOR CHEST
LOCATION DETAILED: RIGHT INFERIOR UPPER BACK
LOCATION DETAILED: RIGHT MID-UPPER BACK
LOCATION DETAILED: RIGHT DISTAL DORSAL FOREARM
LOCATION DETAILED: LEFT ANTERIOR PROXIMAL UPPER ARM
LOCATION DETAILED: RIGHT CENTRAL MALAR CHEEK
LOCATION DETAILED: RIGHT ANTERIOR DISTAL THIGH
LOCATION DETAILED: LEFT DISTAL DORSAL FOREARM
LOCATION DETAILED: RIGHT KNEE
LOCATION DETAILED: RIGHT SUPERIOR MEDIAL UPPER BACK
LOCATION DETAILED: RIGHT ANTERIOR PROXIMAL UPPER ARM
LOCATION DETAILED: NASAL SUPRATIP
LOCATION DETAILED: RIGHT INFERIOR MEDIAL MALAR CHEEK

## 2021-08-12 NOTE — PROCEDURE: LIQUID NITROGEN
Render Note In Bullet Format When Appropriate: No
Number Of Freeze-Thaw Cycles: 2 freeze-thaw cycles
Post-Care Instructions: I reviewed with the patient in detail post-care instructions. Patient is to wear sunprotection, and avoid picking at any of the treated lesions. Pt may apply Vaseline to crusted or scabbing areas.
Show Aperture Variable?: Yes
Consent: The patient's consent was obtained including but not limited to risks of crusting, scabbing, blistering, scarring, darker or lighter pigmentary change, recurrence, incomplete removal and infection.
Detail Level: Simple
Aperture Size (Optional): C
Duration Of Freeze Thaw-Cycle (Seconds): 3
Statement Selected
Detail Level: Detailed
Medical Necessity Clause: This procedure was medically necessary because the lesions that were treated were:
Number Of Freeze-Thaw Cycles: 1 freeze-thaw cycle
Medical Necessity Information: It is in your best interest to select a reason for this procedure from the list below. All of these items fulfill various CMS LCD requirements except the new and changing color options.

## 2021-08-12 NOTE — PROCEDURE: COUNSELING
Detail Level: Zone
Detail Level: Detailed
PEDRO PABLO:'[id number]'
Benzoyl Peroxide Counseling: Patient counseled that medicine may cause skin irritation and bleach clothing.  In the event of skin irritation, the patient was advised to reduce the amount of the drug applied or use it less frequently.   The patient verbalized understanding of the proper use and possible adverse effects of benzoyl peroxide.  All of the patient's questions and concerns were addressed.
Sarecycline Pregnancy And Lactation Text: This medication is Pregnancy Category D and not consider safe during pregnancy. It is also excreted in breast milk.
Dapsone Counseling: I discussed with the patient the risks of dapsone including but not limited to hemolytic anemia, agranulocytosis, rashes, methemoglobinemia, kidney failure, peripheral neuropathy, headaches, GI upset, and liver toxicity.  Patients who start dapsone require monitoring including baseline LFTs and weekly CBCs for the first month, then every month thereafter.  The patient verbalized understanding of the proper use and possible adverse effects of dapsone.  All of the patient's questions and concerns were addressed.
Topical Sulfur Applications Pregnancy And Lactation Text: This medication is Pregnancy Category C and has an unknown safety profile during pregnancy. It is unknown if this topical medication is excreted in breast milk.
High Dose Vitamin A Pregnancy And Lactation Text: High dose vitamin A therapy is contraindicated during pregnancy and breast feeding.
High Dose Vitamin A Counseling: Side effects reviewed, pt to contact office should one occur.
Topical Sulfur Applications Counseling: Topical Sulfur Counseling: Patient counseled that this medication may cause skin irritation or allergic reactions.  In the event of skin irritation, the patient was advised to reduce the amount of the drug applied or use it less frequently.   The patient verbalized understanding of the proper use and possible adverse effects of topical sulfur application.  All of the patient's questions and concerns were addressed.
Azithromycin Pregnancy And Lactation Text: This medication is considered safe during pregnancy and is also secreted in breast milk.
Tazorac Counseling:  Patient advised that medication is irritating and drying.  Patient may need to apply sparingly and wash off after an hour before eventually leaving it on overnight.  The patient verbalized understanding of the proper use and possible adverse effects of tazorac.  All of the patient's questions and concerns were addressed.
Erythromycin Counseling:  I discussed with the patient the risks of erythromycin including but not limited to GI upset, allergic reaction, drug rash, diarrhea, increase in liver enzymes, and yeast infections.
Minocycline Counseling: Patient advised regarding possible photosensitivity and discoloration of the teeth, skin, lips, tongue and gums.  Patient instructed to avoid sunlight, if possible.  When exposed to sunlight, patients should wear protective clothing, sunglasses, and sunscreen.  The patient was instructed to call the office immediately if the following severe adverse effects occur:  hearing changes, easy bruising/bleeding, severe headache, or vision changes.  The patient verbalized understanding of the proper use and possible adverse effects of minocycline.  All of the patient's questions and concerns were addressed.
Benzoyl Peroxide Pregnancy And Lactation Text: This medication is Pregnancy Category C. It is unknown if benzoyl peroxide is excreted in breast milk.
Isotretinoin Counseling: Patient should get monthly blood tests, not donate blood, not drive at night if vision affected, not share medication, and not undergo elective surgery for 6 months after tx completed. Side effects reviewed, pt to contact office should one occur.
Dapsone Pregnancy And Lactation Text: This medication is Pregnancy Category C and is not considered safe during pregnancy or breast feeding.
Erythromycin Pregnancy And Lactation Text: This medication is Pregnancy Category B and is considered safe during pregnancy. It is also excreted in breast milk.
Tazorac Pregnancy And Lactation Text: This medication is not safe during pregnancy. It is unknown if this medication is excreted in breast milk.
Include Pregnancy/Lactation Warning?: No
Spironolactone Counseling: Patient advised regarding risks of diarrhea, abdominal pain, hyperkalemia, birth defects (for female patients), liver toxicity and renal toxicity. The patient may need blood work to monitor liver and kidney function and potassium levels while on therapy. The patient verbalized understanding of the proper use and possible adverse effects of spironolactone.  All of the patient's questions and concerns were addressed.
Bactrim Counseling:  I discussed with the patient the risks of sulfa antibiotics including but not limited to GI upset, allergic reaction, drug rash, diarrhea, dizziness, photosensitivity, and yeast infections.  Rarely, more serious reactions can occur including but not limited to aplastic anemia, agranulocytosis, methemoglobinemia, blood dyscrasias, liver or kidney failure, lung infiltrates or desquamative/blistering drug rashes.
Doxycycline Counseling:  Patient counseled regarding possible photosensitivity and increased risk for sunburn.  Patient instructed to avoid sunlight, if possible.  When exposed to sunlight, patients should wear protective clothing, sunglasses, and sunscreen.  The patient was instructed to call the office immediately if the following severe adverse effects occur:  hearing changes, easy bruising/bleeding, severe headache, or vision changes.  The patient verbalized understanding of the proper use and possible adverse effects of doxycycline.  All of the patient's questions and concerns were addressed.
Topical Retinoid counseling:  Patient advised to apply a pea-sized amount only at bedtime and wait 30 minutes after washing their face before applying.  If too drying, patient may add a non-comedogenic moisturizer. The patient verbalized understanding of the proper use and possible adverse effects of retinoids.  All of the patient's questions and concerns were addressed.
Topical Clindamycin Pregnancy And Lactation Text: This medication is Pregnancy Category B and is considered safe during pregnancy. It is unknown if it is excreted in breast milk.
Birth Control Pills Counseling: Birth Control Pill Counseling: I discussed with the patient the potential side effects of OCPs including but not limited to increased risk of stroke, heart attack, thrombophlebitis, deep venous thrombosis, hepatic adenomas, breast changes, GI upset, headaches, and depression.  The patient verbalized understanding of the proper use and possible adverse effects of OCPs. All of the patient's questions and concerns were addressed.
Bactrim Pregnancy And Lactation Text: This medication is Pregnancy Category D and is known to cause fetal risk.  It is also excreted in breast milk.
Spironolactone Pregnancy And Lactation Text: This medication can cause feminization of the male fetus and should be avoided during pregnancy. The active metabolite is also found in breast milk.
Topical Clindamycin Counseling: Patient counseled that this medication may cause skin irritation or allergic reactions.  In the event of skin irritation, the patient was advised to reduce the amount of the drug applied or use it less frequently.   The patient verbalized understanding of the proper use and possible adverse effects of clindamycin.  All of the patient's questions and concerns were addressed.
Topical Retinoid Pregnancy And Lactation Text: This medication is Pregnancy Category C. It is unknown if this medication is excreted in breast milk.
Birth Control Pills Pregnancy And Lactation Text: This medication should be avoided if pregnant and for the first 30 days post-partum.
Isotretinoin Pregnancy And Lactation Text: This medication is Pregnancy Category X and is considered extremely dangerous during pregnancy. It is unknown if it is excreted in breast milk.
Tetracycline Counseling: Patient counseled regarding possible photosensitivity and increased risk for sunburn.  Patient instructed to avoid sunlight, if possible.  When exposed to sunlight, patients should wear protective clothing, sunglasses, and sunscreen.  The patient was instructed to call the office immediately if the following severe adverse effects occur:  hearing changes, easy bruising/bleeding, severe headache, or vision changes.  The patient verbalized understanding of the proper use and possible adverse effects of tetracycline.  All of the patient's questions and concerns were addressed. Patient understands to avoid pregnancy while on therapy due to potential birth defects.
Doxycycline Pregnancy And Lactation Text: This medication is Pregnancy Category D and not consider safe during pregnancy. It is also excreted in breast milk but is considered safe for shorter treatment courses.
Azithromycin Counseling:  I discussed with the patient the risks of azithromycin including but not limited to GI upset, allergic reaction, drug rash, diarrhea, and yeast infections.
Sarecycline Counseling: Patient advised regarding possible photosensitivity and discoloration of the teeth, skin, lips, tongue and gums.  Patient instructed to avoid sunlight, if possible.  When exposed to sunlight, patients should wear protective clothing, sunglasses, and sunscreen.  The patient was instructed to call the office immediately if the following severe adverse effects occur:  hearing changes, easy bruising/bleeding, severe headache, or vision changes.  The patient verbalized understanding of the proper use and possible adverse effects of sarecycline.  All of the patient's questions and concerns were addressed.

## 2021-11-09 ENCOUNTER — TELEPHONE (OUTPATIENT)
Dept: HEALTH INFORMATION MANAGEMENT | Facility: OTHER | Age: 71
End: 2021-11-09

## 2021-11-09 NOTE — TELEPHONE ENCOUNTER
Outcome: No answer and mailbox not set up. Unable to leave message to schedule Comprehensive Health Assessment.     Please transfer to Patient Outreach Team at [phone] when patient returns call.      [facility] Verified: yes    Attempt # 2

## 2022-01-01 ENCOUNTER — APPOINTMENT (OUTPATIENT)
Dept: RADIOLOGY | Facility: MEDICAL CENTER | Age: 72
DRG: 215 | End: 2022-01-01
Attending: STUDENT IN AN ORGANIZED HEALTH CARE EDUCATION/TRAINING PROGRAM
Payer: MEDICARE

## 2022-01-01 ENCOUNTER — APPOINTMENT (OUTPATIENT)
Dept: CARDIOLOGY | Facility: MEDICAL CENTER | Age: 72
DRG: 215 | End: 2022-01-01
Attending: INTERNAL MEDICINE
Payer: MEDICARE

## 2022-01-01 ENCOUNTER — APPOINTMENT (OUTPATIENT)
Dept: RADIOLOGY | Facility: MEDICAL CENTER | Age: 72
DRG: 215 | End: 2022-01-01
Attending: EMERGENCY MEDICINE
Payer: MEDICARE

## 2022-01-01 ENCOUNTER — DOCUMENTATION (OUTPATIENT)
Dept: HEALTH INFORMATION MANAGEMENT | Facility: OTHER | Age: 72
End: 2022-01-01
Payer: MEDICARE

## 2022-01-01 ENCOUNTER — APPOINTMENT (OUTPATIENT)
Dept: RADIOLOGY | Facility: MEDICAL CENTER | Age: 72
DRG: 215 | End: 2022-01-01
Attending: INTERNAL MEDICINE
Payer: MEDICARE

## 2022-01-01 ENCOUNTER — HOSPITAL ENCOUNTER (EMERGENCY)
Facility: MEDICAL CENTER | Age: 72
End: 2022-12-13
Attending: STUDENT IN AN ORGANIZED HEALTH CARE EDUCATION/TRAINING PROGRAM
Payer: MEDICARE

## 2022-01-01 ENCOUNTER — APPOINTMENT (OUTPATIENT)
Dept: RADIOLOGY | Facility: MEDICAL CENTER | Age: 72
End: 2022-01-01
Attending: STUDENT IN AN ORGANIZED HEALTH CARE EDUCATION/TRAINING PROGRAM
Payer: MEDICARE

## 2022-01-01 ENCOUNTER — HOSPITAL ENCOUNTER (INPATIENT)
Facility: MEDICAL CENTER | Age: 72
LOS: 1 days | DRG: 215 | End: 2022-12-14
Attending: EMERGENCY MEDICINE | Admitting: STUDENT IN AN ORGANIZED HEALTH CARE EDUCATION/TRAINING PROGRAM
Payer: MEDICARE

## 2022-01-01 ENCOUNTER — PHARMACY VISIT (OUTPATIENT)
Dept: PHARMACY | Facility: MEDICAL CENTER | Age: 72
End: 2022-01-01
Payer: COMMERCIAL

## 2022-01-01 ENCOUNTER — HOSPITAL ENCOUNTER (OUTPATIENT)
Facility: MEDICAL CENTER | Age: 72
End: 2022-11-30
Attending: INTERNAL MEDICINE
Payer: MEDICARE

## 2022-01-01 ENCOUNTER — TELEPHONE (OUTPATIENT)
Dept: HEALTH INFORMATION MANAGEMENT | Facility: OTHER | Age: 72
End: 2022-01-01

## 2022-01-01 ENCOUNTER — HOSPITAL ENCOUNTER (OUTPATIENT)
Dept: RADIOLOGY | Facility: MEDICAL CENTER | Age: 72
End: 2022-05-02
Attending: INTERNAL MEDICINE
Payer: MEDICARE

## 2022-01-01 VITALS
TEMPERATURE: 97.4 F | WEIGHT: 152.34 LBS | HEIGHT: 63 IN | HEART RATE: 107 BPM | RESPIRATION RATE: 26 BRPM | OXYGEN SATURATION: 94 % | SYSTOLIC BLOOD PRESSURE: 86 MMHG | BODY MASS INDEX: 26.99 KG/M2 | DIASTOLIC BLOOD PRESSURE: 54 MMHG

## 2022-01-01 VITALS
TEMPERATURE: 67.8 F | HEIGHT: 63 IN | WEIGHT: 152.34 LBS | OXYGEN SATURATION: 84 % | DIASTOLIC BLOOD PRESSURE: 84 MMHG | SYSTOLIC BLOOD PRESSURE: 124 MMHG | BODY MASS INDEX: 26.99 KG/M2

## 2022-01-01 DIAGNOSIS — I25.9 CHEST PAIN DUE TO MYOCARDIAL ISCHEMIA, UNSPECIFIED ISCHEMIC CHEST PAIN TYPE: ICD-10-CM

## 2022-01-01 DIAGNOSIS — I21.4 NSTEMI (NON-ST ELEVATED MYOCARDIAL INFARCTION) (HCC): ICD-10-CM

## 2022-01-01 DIAGNOSIS — R57.0 CARDIOGENIC SHOCK (HCC): ICD-10-CM

## 2022-01-01 DIAGNOSIS — E27.8 ADRENAL MASS (HCC): ICD-10-CM

## 2022-01-01 DIAGNOSIS — J18.9 COMMUNITY ACQUIRED PNEUMONIA, UNSPECIFIED LATERALITY: ICD-10-CM

## 2022-01-01 DIAGNOSIS — Z12.31 VISIT FOR SCREENING MAMMOGRAM: ICD-10-CM

## 2022-01-01 DIAGNOSIS — I20.0 UNSTABLE ANGINA PECTORIS (HCC): ICD-10-CM

## 2022-01-01 DIAGNOSIS — E87.20 LACTIC ACIDOSIS: ICD-10-CM

## 2022-01-01 DIAGNOSIS — D72.829 LEUKOCYTOSIS, UNSPECIFIED TYPE: ICD-10-CM

## 2022-01-01 DIAGNOSIS — I21.4 NSTEMI (NON-ST ELEVATED MYOCARDIAL INFARCTION) (HCC): Primary | ICD-10-CM

## 2022-01-01 LAB
25(OH)D3 SERPL-MCNC: 55 NG/ML (ref 30–100)
ACT BLD: 149 SEC (ref 74–137)
ACT BLD: 155 SEC (ref 74–137)
ACT BLD: 155 SEC (ref 74–137)
ACT BLD: 167 SEC (ref 74–137)
ACT BLD: 215 SEC (ref 74–137)
ALBUMIN SERPL BCP-MCNC: 4.3 G/DL (ref 3.2–4.9)
ALBUMIN SERPL BCP-MCNC: 4.4 G/DL (ref 3.2–4.9)
ALBUMIN SERPL BCP-MCNC: 4.6 G/DL (ref 3.2–4.9)
ALBUMIN/GLOB SERPL: 1.3 G/DL
ALBUMIN/GLOB SERPL: 1.3 G/DL
ALBUMIN/GLOB SERPL: 1.5 G/DL
ALP SERPL-CCNC: 101 U/L (ref 30–99)
ALP SERPL-CCNC: 110 U/L (ref 30–99)
ALP SERPL-CCNC: 88 U/L (ref 30–99)
ALT SERPL-CCNC: 20 U/L (ref 2–50)
ALT SERPL-CCNC: 45 U/L (ref 2–50)
ALT SERPL-CCNC: 46 U/L (ref 2–50)
AMPHET UR QL SCN: NEGATIVE
ANION GAP SERPL CALC-SCNC: 10 MMOL/L (ref 7–16)
ANION GAP SERPL CALC-SCNC: 15 MMOL/L (ref 7–16)
ANION GAP SERPL CALC-SCNC: 19 MMOL/L (ref 7–16)
APPEARANCE UR: CLEAR
APTT PPP: 22.2 SEC (ref 24.7–36)
APTT PPP: 27.7 SEC (ref 24.7–36)
AST SERPL-CCNC: 27 U/L (ref 12–45)
AST SERPL-CCNC: 63 U/L (ref 12–45)
AST SERPL-CCNC: 68 U/L (ref 12–45)
BACTERIA #/AREA URNS HPF: NEGATIVE /HPF
BARBITURATES UR QL SCN: NEGATIVE
BASE EXCESS BLDA CALC-SCNC: -6 MMOL/L (ref -4–3)
BASE EXCESS BLDA CALC-SCNC: -9 MMOL/L (ref -4–3)
BASE EXCESS BLDA CALC-SCNC: -9 MMOL/L (ref -4–3)
BASE EXCESS BLDA CALC-SCNC: 0 MMOL/L (ref -4–3)
BASOPHILS # BLD AUTO: 0.7 % (ref 0–1.8)
BASOPHILS # BLD AUTO: 0.7 % (ref 0–1.8)
BASOPHILS # BLD: 0.11 K/UL (ref 0–0.12)
BASOPHILS # BLD: 0.14 K/UL (ref 0–0.12)
BENZODIAZ UR QL SCN: NEGATIVE
BILIRUB SERPL-MCNC: 0.7 MG/DL (ref 0.1–1.5)
BILIRUB SERPL-MCNC: 0.7 MG/DL (ref 0.1–1.5)
BILIRUB SERPL-MCNC: 1 MG/DL (ref 0.1–1.5)
BILIRUB UR QL STRIP.AUTO: NEGATIVE
BODY TEMPERATURE: ABNORMAL DEGREES
BREATHS SETTING VENT: 30
BREATHS SETTING VENT: 30
BUN SERPL-MCNC: 17 MG/DL (ref 8–22)
BUN SERPL-MCNC: 22 MG/DL (ref 8–22)
BUN SERPL-MCNC: 23 MG/DL (ref 8–22)
BZE UR QL SCN: NEGATIVE
CA-I BLD ISE-SCNC: 1.12 MMOL/L (ref 1.1–1.3)
CALCIUM SERPL-MCNC: 9.3 MG/DL (ref 8.4–10.2)
CALCIUM SERPL-MCNC: 9.3 MG/DL (ref 8.5–10.5)
CALCIUM SERPL-MCNC: 9.5 MG/DL (ref 8.5–10.5)
CANNABINOIDS UR QL SCN: NEGATIVE
CHLORIDE SERPL-SCNC: 101 MMOL/L (ref 96–112)
CHLORIDE SERPL-SCNC: 102 MMOL/L (ref 96–112)
CHLORIDE SERPL-SCNC: 98 MMOL/L (ref 96–112)
CHOLEST SERPL-MCNC: 228 MG/DL (ref 100–199)
CHOLEST SERPL-MCNC: 235 MG/DL (ref 100–199)
CHOLEST SERPL-MCNC: 255 MG/DL (ref 100–199)
CO2 BLDA-SCNC: 17 MMOL/L (ref 20–33)
CO2 BLDA-SCNC: 18 MMOL/L (ref 20–33)
CO2 BLDA-SCNC: 21 MMOL/L (ref 20–33)
CO2 BLDA-SCNC: 23 MMOL/L (ref 20–33)
CO2 SERPL-SCNC: 20 MMOL/L (ref 20–33)
CO2 SERPL-SCNC: 22 MMOL/L (ref 20–33)
CO2 SERPL-SCNC: 27 MMOL/L (ref 20–33)
COLOR UR: YELLOW
CORTIS SERPL-MCNC: 31.6 UG/DL (ref 0–23)
CREAT SERPL-MCNC: 0.78 MG/DL (ref 0.5–1.4)
CREAT SERPL-MCNC: 1.3 MG/DL (ref 0.5–1.4)
CREAT SERPL-MCNC: 1.36 MG/DL (ref 0.5–1.4)
CREAT UR-MCNC: 127.8 MG/DL
D DIMER PPP IA.FEU-MCNC: 0.34 UG/ML (FEU) (ref 0–0.5)
DELSYS IDSYS: ABNORMAL
DELSYS IDSYS: ABNORMAL
EKG IMPRESSION: NORMAL
END TIDAL CARBON DIOXIDE IECO2: 20 MMHG
END TIDAL CARBON DIOXIDE IECO2: 21 MMHG
EOSINOPHIL # BLD AUTO: 0.02 K/UL (ref 0–0.51)
EOSINOPHIL # BLD AUTO: 0.02 K/UL (ref 0–0.51)
EOSINOPHIL NFR BLD: 0.1 % (ref 0–6.9)
EOSINOPHIL NFR BLD: 0.1 % (ref 0–6.9)
EPI CELLS #/AREA URNS HPF: NEGATIVE /HPF
ERYTHROCYTE [DISTWIDTH] IN BLOOD BY AUTOMATED COUNT: 44.8 FL (ref 35.9–50)
ERYTHROCYTE [DISTWIDTH] IN BLOOD BY AUTOMATED COUNT: 45 FL (ref 35.9–50)
ERYTHROCYTE [DISTWIDTH] IN BLOOD BY AUTOMATED COUNT: 45.1 FL (ref 35.9–50)
ERYTHROCYTE [DISTWIDTH] IN BLOOD BY AUTOMATED COUNT: 45.1 FL (ref 35.9–50)
EST. AVERAGE GLUCOSE BLD GHB EST-MCNC: 120 MG/DL
EST. AVERAGE GLUCOSE BLD GHB EST-MCNC: 120 MG/DL
FLUAV RNA SPEC QL NAA+PROBE: NEGATIVE
FLUBV RNA SPEC QL NAA+PROBE: NEGATIVE
GFR SERPLBLD CREATININE-BSD FMLA CKD-EPI: 41 ML/MIN/1.73 M 2
GFR SERPLBLD CREATININE-BSD FMLA CKD-EPI: 43 ML/MIN/1.73 M 2
GFR SERPLBLD CREATININE-BSD FMLA CKD-EPI: 80 ML/MIN/1.73 M 2
GLOBULIN SER CALC-MCNC: 3 G/DL (ref 1.9–3.5)
GLOBULIN SER CALC-MCNC: 3.4 G/DL (ref 1.9–3.5)
GLOBULIN SER CALC-MCNC: 3.4 G/DL (ref 1.9–3.5)
GLUCOSE BLD STRIP.AUTO-MCNC: 170 MG/DL (ref 65–99)
GLUCOSE BLD STRIP.AUTO-MCNC: 280 MG/DL (ref 65–99)
GLUCOSE SERPL-MCNC: 197 MG/DL (ref 65–99)
GLUCOSE SERPL-MCNC: 259 MG/DL (ref 65–99)
GLUCOSE SERPL-MCNC: 341 MG/DL (ref 65–99)
GLUCOSE SERPL-MCNC: 86 MG/DL (ref 65–99)
GLUCOSE UR STRIP.AUTO-MCNC: NEGATIVE MG/DL
GRAN CASTS #/AREA URNS LPF: ABNORMAL /LPF
HBA1C MFR BLD: 5.8 % (ref 4–5.6)
HBA1C MFR BLD: 5.8 % (ref 4–5.6)
HCO3 BLDA-SCNC: 16 MMOL/L (ref 17–25)
HCO3 BLDA-SCNC: 17.4 MMOL/L (ref 17–25)
HCO3 BLDA-SCNC: 19.9 MMOL/L (ref 17–25)
HCO3 BLDA-SCNC: 22.2 MMOL/L (ref 17–25)
HCT VFR BLD AUTO: 45.1 % (ref 37–47)
HCT VFR BLD AUTO: 45.9 % (ref 37–47)
HCT VFR BLD AUTO: 46 % (ref 37–47)
HCT VFR BLD AUTO: 46.4 % (ref 37–47)
HCV AB SER QL: NORMAL
HDLC SERPL-MCNC: 55 MG/DL
HDLC SERPL-MCNC: 68 MG/DL
HDLC SERPL-MCNC: 69 MG/DL
HGB BLD-MCNC: 15 G/DL (ref 12–16)
HGB BLD-MCNC: 15.4 G/DL (ref 12–16)
HGB BLD-MCNC: 15.4 G/DL (ref 12–16)
HGB BLD-MCNC: 15.9 G/DL (ref 12–16)
HOROWITZ INDEX BLDA+IHG-RTO: 102 MM[HG]
HOROWITZ INDEX BLDA+IHG-RTO: 126 MM[HG]
HOROWITZ INDEX BLDA+IHG-RTO: 148 MM[HG]
HOROWITZ INDEX BLDA+IHG-RTO: 300 MM[HG]
HYALINE CASTS #/AREA URNS LPF: >10 /LPF
IMM GRANULOCYTES # BLD AUTO: 0.13 K/UL (ref 0–0.11)
IMM GRANULOCYTES # BLD AUTO: 0.13 K/UL (ref 0–0.11)
IMM GRANULOCYTES NFR BLD AUTO: 0.7 % (ref 0–0.9)
IMM GRANULOCYTES NFR BLD AUTO: 0.8 % (ref 0–0.9)
INR PPP: 1.02 (ref 0.87–1.13)
INR PPP: 1.06 (ref 0.87–1.13)
INR PPP: 1.13 (ref 0.87–1.13)
INR PPP: 1.31 (ref 0.87–1.13)
KETONES UR STRIP.AUTO-MCNC: NEGATIVE MG/DL
LACTATE BLD-SCNC: 4.5 MMOL/L (ref 0.5–2)
LACTATE SERPL-SCNC: 4.3 MMOL/L (ref 0.5–2)
LACTATE SERPL-SCNC: 4.8 MMOL/L (ref 0.5–2)
LACTATE SERPL-SCNC: 6.2 MMOL/L (ref 0.5–2)
LDLC SERPL CALC-MCNC: 150 MG/DL
LDLC SERPL CALC-MCNC: 150 MG/DL
LDLC SERPL CALC-MCNC: 173 MG/DL
LEUKOCYTE ESTERASE UR QL STRIP.AUTO: NEGATIVE
LIPASE SERPL-CCNC: 43 U/L (ref 7–58)
LV EJECT FRACT  99904: 30
LV EJECT FRACT  99904: 35
LV EJECT FRACT MOD 2C 99903: 30.7
LV EJECT FRACT MOD 4C 99902: 41.4
LV EJECT FRACT MOD BP 99901: 35.58
LYMPHOCYTES # BLD AUTO: 0.84 K/UL (ref 1–4.8)
LYMPHOCYTES # BLD AUTO: 1.64 K/UL (ref 1–4.8)
LYMPHOCYTES NFR BLD: 5.1 % (ref 22–41)
LYMPHOCYTES NFR BLD: 8.4 % (ref 22–41)
MCH RBC QN AUTO: 30.2 PG (ref 27–33)
MCH RBC QN AUTO: 30.4 PG (ref 27–33)
MCH RBC QN AUTO: 30.6 PG (ref 27–33)
MCH RBC QN AUTO: 30.6 PG (ref 27–33)
MCHC RBC AUTO-ENTMCNC: 32.7 G/DL (ref 33.6–35)
MCHC RBC AUTO-ENTMCNC: 33.5 G/DL (ref 33.6–35)
MCHC RBC AUTO-ENTMCNC: 34.1 G/DL (ref 33.6–35)
MCHC RBC AUTO-ENTMCNC: 34.3 G/DL (ref 33.6–35)
MCV RBC AUTO: 89.4 FL (ref 81.4–97.8)
MCV RBC AUTO: 89.5 FL (ref 81.4–97.8)
MCV RBC AUTO: 90.7 FL (ref 81.4–97.8)
MCV RBC AUTO: 92.5 FL (ref 81.4–97.8)
METHADONE UR QL SCN: NEGATIVE
MICRO URNS: ABNORMAL
MODE IMODE: ABNORMAL
MODE IMODE: ABNORMAL
MONOCYTES # BLD AUTO: 0.99 K/UL (ref 0–0.85)
MONOCYTES # BLD AUTO: 1.38 K/UL (ref 0–0.85)
MONOCYTES NFR BLD AUTO: 6 % (ref 0–13.4)
MONOCYTES NFR BLD AUTO: 7.1 % (ref 0–13.4)
NEUTROPHILS # BLD AUTO: 14.4 K/UL (ref 2–7.15)
NEUTROPHILS # BLD AUTO: 16.21 K/UL (ref 2–7.15)
NEUTROPHILS NFR BLD: 83 % (ref 44–72)
NEUTROPHILS NFR BLD: 87.3 % (ref 44–72)
NITRITE UR QL STRIP.AUTO: NEGATIVE
NRBC # BLD AUTO: 0 K/UL
NRBC # BLD AUTO: 0 K/UL
NRBC BLD-RTO: 0 /100 WBC
NRBC BLD-RTO: 0 /100 WBC
NT-PROBNP SERPL IA-MCNC: 1248 PG/ML (ref 0–125)
NT-PROBNP SERPL IA-MCNC: 322 PG/ML (ref 0–125)
NT-PROBNP SERPL IA-MCNC: 5445 PG/ML (ref 0–125)
O2/TOTAL GAS SETTING VFR VENT: 100 %
O2/TOTAL GAS SETTING VFR VENT: 100 %
O2/TOTAL GAS SETTING VFR VENT: 60 %
O2/TOTAL GAS SETTING VFR VENT: 70 %
OPIATES UR QL SCN: NEGATIVE
OXYCODONE UR QL SCN: NEGATIVE
PCO2 BLDA: 27.8 MMHG (ref 26–37)
PCO2 BLDA: 29.2 MMHG (ref 26–37)
PCO2 BLDA: 31.1 MMHG (ref 26–37)
PCO2 BLDA: 51.8 MMHG (ref 26–37)
PCO2 TEMP ADJ BLDA: 31.4 MMHG (ref 26–37)
PCO2 TEMP ADJ BLDA: 31.5 MMHG (ref 26–37)
PCO2 TEMP ADJ BLDA: 32.4 MMHG (ref 26–37)
PCO2 TEMP ADJ BLDA: 53.7 MMHG (ref 26–37)
PCP UR QL SCN: NEGATIVE
PEEP END EXPIRATORY PRESSURE IPEEP: 8 CMH20
PEEP END EXPIRATORY PRESSURE IPEEP: 8 CMH20
PH BLDA: 7.19 [PH] (ref 7.4–7.5)
PH BLDA: 7.32 [PH] (ref 7.4–7.5)
PH BLDA: 7.4 [PH] (ref 7.4–7.5)
PH BLDA: 7.49 [PH] (ref 7.4–7.5)
PH TEMP ADJ BLDA: 7.18 [PH] (ref 7.4–7.5)
PH TEMP ADJ BLDA: 7.31 [PH] (ref 7.4–7.5)
PH TEMP ADJ BLDA: 7.36 [PH] (ref 7.4–7.5)
PH TEMP ADJ BLDA: 7.46 [PH] (ref 7.4–7.5)
PH UR STRIP.AUTO: 5 [PH] (ref 5–8)
PLATELET # BLD AUTO: 244 K/UL (ref 164–446)
PLATELET # BLD AUTO: 283 K/UL (ref 164–446)
PLATELET # BLD AUTO: 293 K/UL (ref 164–446)
PLATELET # BLD AUTO: 307 K/UL (ref 164–446)
PMV BLD AUTO: 11.2 FL (ref 9–12.9)
PMV BLD AUTO: 11.4 FL (ref 9–12.9)
PMV BLD AUTO: 11.5 FL (ref 9–12.9)
PMV BLD AUTO: 11.5 FL (ref 9–12.9)
PO2 BLDA: 102 MMHG (ref 64–87)
PO2 BLDA: 300 MMHG (ref 64–87)
PO2 BLDA: 88 MMHG (ref 64–87)
PO2 BLDA: 89 MMHG (ref 64–87)
PO2 TEMP ADJ BLDA: 105 MMHG (ref 64–87)
PO2 TEMP ADJ BLDA: 107 MMHG (ref 64–87)
PO2 TEMP ADJ BLDA: 309 MMHG (ref 64–87)
PO2 TEMP ADJ BLDA: 94 MMHG (ref 64–87)
POTASSIUM BLD-SCNC: 4.3 MMOL/L (ref 3.6–5.5)
POTASSIUM SERPL-SCNC: 3.6 MMOL/L (ref 3.6–5.5)
POTASSIUM SERPL-SCNC: 4.1 MMOL/L (ref 3.6–5.5)
POTASSIUM SERPL-SCNC: 4.2 MMOL/L (ref 3.6–5.5)
PROCALCITONIN SERPL-MCNC: 0.31 NG/ML
PROCALCITONIN SERPL-MCNC: 1.13 NG/ML
PROPOXYPH UR QL SCN: NEGATIVE
PROT SERPL-MCNC: 7.6 G/DL (ref 6–8.2)
PROT SERPL-MCNC: 7.7 G/DL (ref 6–8.2)
PROT SERPL-MCNC: 7.8 G/DL (ref 6–8.2)
PROT UR QL STRIP: 30 MG/DL
PROTHROMBIN TIME: 13.3 SEC (ref 12–14.6)
PROTHROMBIN TIME: 13.7 SEC (ref 12–14.6)
PROTHROMBIN TIME: 14.3 SEC (ref 12–14.6)
PROTHROMBIN TIME: 16 SEC (ref 12–14.6)
RBC # BLD AUTO: 4.96 M/UL (ref 4.2–5.4)
RBC # BLD AUTO: 5.04 M/UL (ref 4.2–5.4)
RBC # BLD AUTO: 5.07 M/UL (ref 4.2–5.4)
RBC # BLD AUTO: 5.19 M/UL (ref 4.2–5.4)
RBC # URNS HPF: ABNORMAL /HPF
RBC UR QL AUTO: NEGATIVE
RSV RNA SPEC QL NAA+PROBE: NEGATIVE
SAO2 % BLDA: 100 % (ref 93–99)
SAO2 % BLDA: 96 % (ref 93–99)
SAO2 % BLDA: 96 % (ref 93–99)
SAO2 % BLDA: 97 % (ref 93–99)
SARS-COV-2 RNA RESP QL NAA+PROBE: NOTDETECTED
SODIUM BLD-SCNC: 142 MMOL/L (ref 135–145)
SODIUM SERPL-SCNC: 137 MMOL/L (ref 135–145)
SODIUM SERPL-SCNC: 138 MMOL/L (ref 135–145)
SODIUM SERPL-SCNC: 139 MMOL/L (ref 135–145)
SODIUM UR-SCNC: 27 MMOL/L
SP GR UR STRIP.AUTO: >=1.045
SPECIMEN DRAWN FROM PATIENT: ABNORMAL
SPECIMEN SOURCE: NORMAL
TIDAL VOLUME IVT: 460 ML
TIDAL VOLUME IVT: 460 ML
TRIGL SERPL-MCNC: 134 MG/DL (ref 0–149)
TRIGL SERPL-MCNC: 50 MG/DL (ref 0–149)
TRIGL SERPL-MCNC: 79 MG/DL (ref 0–149)
TROPONIN T SERPL-MCNC: 1113 NG/L (ref 6–19)
TROPONIN T SERPL-MCNC: 1147 NG/L (ref 6–19)
TROPONIN T SERPL-MCNC: 1438 NG/L (ref 6–19)
TROPONIN T SERPL-MCNC: 1442 NG/L (ref 6–19)
TROPONIN T SERPL-MCNC: 853 NG/L (ref 6–19)
TSH SERPL DL<=0.005 MIU/L-ACNC: 2.37 UIU/ML (ref 0.38–5.33)
UFH PPP CHRO-ACNC: 0.54 IU/ML
UFH PPP CHRO-ACNC: <0.1 IU/ML
UFH PPP CHRO-ACNC: <0.1 IU/ML
UROBILINOGEN UR STRIP.AUTO-MCNC: 0.2 MG/DL
WBC # BLD AUTO: 16.5 K/UL (ref 4.8–10.8)
WBC # BLD AUTO: 19.5 K/UL (ref 4.8–10.8)
WBC # BLD AUTO: 20.1 K/UL (ref 4.8–10.8)
WBC # BLD AUTO: 24.4 K/UL (ref 4.8–10.8)
WBC #/AREA URNS HPF: ABNORMAL /HPF

## 2022-01-01 PROCEDURE — 31500 INSERT EMERGENCY AIRWAY: CPT | Performed by: INTERNAL MEDICINE

## 2022-01-01 PROCEDURE — 71045 X-RAY EXAM CHEST 1 VIEW: CPT

## 2022-01-01 PROCEDURE — A9270 NON-COVERED ITEM OR SERVICE: HCPCS | Performed by: STUDENT IN AN ORGANIZED HEALTH CARE EDUCATION/TRAINING PROGRAM

## 2022-01-01 PROCEDURE — 83605 ASSAY OF LACTIC ACID: CPT | Mod: 91

## 2022-01-01 PROCEDURE — RXMED WILLOW AMBULATORY MEDICATION CHARGE: Performed by: INTERNAL MEDICINE

## 2022-01-01 PROCEDURE — 02HP32Z INSERTION OF MONITORING DEVICE INTO PULMONARY TRUNK, PERCUTANEOUS APPROACH: ICD-10-PCS | Performed by: INTERNAL MEDICINE

## 2022-01-01 PROCEDURE — 93010 ELECTROCARDIOGRAM REPORT: CPT | Performed by: INTERNAL MEDICINE

## 2022-01-01 PROCEDURE — 85730 THROMBOPLASTIN TIME PARTIAL: CPT

## 2022-01-01 PROCEDURE — B2111ZZ FLUOROSCOPY OF MULTIPLE CORONARY ARTERIES USING LOW OSMOLAR CONTRAST: ICD-10-PCS | Performed by: INTERNAL MEDICINE

## 2022-01-01 PROCEDURE — 83880 ASSAY OF NATRIURETIC PEPTIDE: CPT | Mod: 91

## 2022-01-01 PROCEDURE — 87040 BLOOD CULTURE FOR BACTERIA: CPT | Mod: 91

## 2022-01-01 PROCEDURE — 37799 UNLISTED PX VASCULAR SURGERY: CPT

## 2022-01-01 PROCEDURE — 94760 N-INVAS EAR/PLS OXIMETRY 1: CPT

## 2022-01-01 PROCEDURE — 84132 ASSAY OF SERUM POTASSIUM: CPT

## 2022-01-01 PROCEDURE — C9113 INJ PANTOPRAZOLE SODIUM, VIA: HCPCS | Performed by: STUDENT IN AN ORGANIZED HEALTH CARE EDUCATION/TRAINING PROGRAM

## 2022-01-01 PROCEDURE — 93005 ELECTROCARDIOGRAM TRACING: CPT | Mod: XE,76 | Performed by: STUDENT IN AN ORGANIZED HEALTH CARE EDUCATION/TRAINING PROGRAM

## 2022-01-01 PROCEDURE — 99291 CRITICAL CARE FIRST HOUR: CPT | Mod: 25 | Performed by: STUDENT IN AN ORGANIZED HEALTH CARE EDUCATION/TRAINING PROGRAM

## 2022-01-01 PROCEDURE — 85027 COMPLETE CBC AUTOMATED: CPT

## 2022-01-01 PROCEDURE — 03HY32Z INSERTION OF MONITORING DEVICE INTO UPPER ARTERY, PERCUTANEOUS APPROACH: ICD-10-PCS | Performed by: INTERNAL MEDICINE

## 2022-01-01 PROCEDURE — 87077 CULTURE AEROBIC IDENTIFY: CPT

## 2022-01-01 PROCEDURE — 83690 ASSAY OF LIPASE: CPT

## 2022-01-01 PROCEDURE — 36415 COLL VENOUS BLD VENIPUNCTURE: CPT

## 2022-01-01 PROCEDURE — 80307 DRUG TEST PRSMV CHEM ANLYZR: CPT

## 2022-01-01 PROCEDURE — 96367 TX/PROPH/DG ADDL SEQ IV INF: CPT

## 2022-01-01 PROCEDURE — 85610 PROTHROMBIN TIME: CPT

## 2022-01-01 PROCEDURE — 84443 ASSAY THYROID STIM HORMONE: CPT

## 2022-01-01 PROCEDURE — 93005 ELECTROCARDIOGRAM TRACING: CPT | Performed by: EMERGENCY MEDICINE

## 2022-01-01 PROCEDURE — 31500 INSERT EMERGENCY AIRWAY: CPT

## 2022-01-01 PROCEDURE — 99292 CRITICAL CARE ADDL 30 MIN: CPT | Mod: 25 | Performed by: INTERNAL MEDICINE

## 2022-01-01 PROCEDURE — 84484 ASSAY OF TROPONIN QUANT: CPT

## 2022-01-01 PROCEDURE — 93005 ELECTROCARDIOGRAM TRACING: CPT | Performed by: INTERNAL MEDICINE

## 2022-01-01 PROCEDURE — 700105 HCHG RX REV CODE 258: Performed by: STUDENT IN AN ORGANIZED HEALTH CARE EDUCATION/TRAINING PROGRAM

## 2022-01-01 PROCEDURE — C9803 HOPD COVID-19 SPEC COLLECT: HCPCS | Performed by: EMERGENCY MEDICINE

## 2022-01-01 PROCEDURE — 93005 ELECTROCARDIOGRAM TRACING: CPT | Performed by: STUDENT IN AN ORGANIZED HEALTH CARE EDUCATION/TRAINING PROGRAM

## 2022-01-01 PROCEDURE — 85520 HEPARIN ASSAY: CPT

## 2022-01-01 PROCEDURE — 82962 GLUCOSE BLOOD TEST: CPT

## 2022-01-01 PROCEDURE — 77063 BREAST TOMOSYNTHESIS BI: CPT

## 2022-01-01 PROCEDURE — 5A1221J PERFORMANCE OF CARDIAC OUTPUT, CONTINUOUS, AUTOMATED: ICD-10-PCS | Performed by: NURSE PRACTITIONER

## 2022-01-01 PROCEDURE — 96365 THER/PROPH/DIAG IV INF INIT: CPT

## 2022-01-01 PROCEDURE — RXMED WILLOW AMBULATORY MEDICATION CHARGE: Performed by: NURSE PRACTITIONER

## 2022-01-01 PROCEDURE — 80061 LIPID PANEL: CPT

## 2022-01-01 PROCEDURE — 76775 US EXAM ABDO BACK WALL LIM: CPT

## 2022-01-01 PROCEDURE — 700111 HCHG RX REV CODE 636 W/ 250 OVERRIDE (IP): Performed by: INTERNAL MEDICINE

## 2022-01-01 PROCEDURE — 93308 TTE F-UP OR LMTD: CPT | Mod: 26 | Performed by: INTERNAL MEDICINE

## 2022-01-01 PROCEDURE — 93005 ELECTROCARDIOGRAM TRACING: CPT

## 2022-01-01 PROCEDURE — 96374 THER/PROPH/DIAG INJ IV PUSH: CPT

## 2022-01-01 PROCEDURE — 36556 INSERT NON-TUNNEL CV CATH: CPT

## 2022-01-01 PROCEDURE — 93306 TTE W/DOPPLER COMPLETE: CPT | Mod: 26 | Performed by: INTERNAL MEDICINE

## 2022-01-01 PROCEDURE — 700111 HCHG RX REV CODE 636 W/ 250 OVERRIDE (IP): Performed by: STUDENT IN AN ORGANIZED HEALTH CARE EDUCATION/TRAINING PROGRAM

## 2022-01-01 PROCEDURE — 700117 HCHG RX CONTRAST REV CODE 255: Performed by: INTERNAL MEDICINE

## 2022-01-01 PROCEDURE — 700105 HCHG RX REV CODE 258: Performed by: INTERNAL MEDICINE

## 2022-01-01 PROCEDURE — 99291 CRITICAL CARE FIRST HOUR: CPT | Performed by: INTERNAL MEDICINE

## 2022-01-01 PROCEDURE — 93010 ELECTROCARDIOGRAM REPORT: CPT | Mod: 59 | Performed by: INTERNAL MEDICINE

## 2022-01-01 PROCEDURE — 0BH18EZ INSERTION OF ENDOTRACHEAL AIRWAY INTO TRACHEA, VIA NATURAL OR ARTIFICIAL OPENING ENDOSCOPIC: ICD-10-PCS | Performed by: INTERNAL MEDICINE

## 2022-01-01 PROCEDURE — 84295 ASSAY OF SERUM SODIUM: CPT

## 2022-01-01 PROCEDURE — 85025 COMPLETE CBC W/AUTO DIFF WBC: CPT

## 2022-01-01 PROCEDURE — 83605 ASSAY OF LACTIC ACID: CPT

## 2022-01-01 PROCEDURE — 700105 HCHG RX REV CODE 258

## 2022-01-01 PROCEDURE — 96368 THER/DIAG CONCURRENT INF: CPT

## 2022-01-01 PROCEDURE — 700102 HCHG RX REV CODE 250 W/ 637 OVERRIDE(OP): Performed by: STUDENT IN AN ORGANIZED HEALTH CARE EDUCATION/TRAINING PROGRAM

## 2022-01-01 PROCEDURE — 99222 1ST HOSP IP/OBS MODERATE 55: CPT | Performed by: INTERNAL MEDICINE

## 2022-01-01 PROCEDURE — 93306 TTE W/DOPPLER COMPLETE: CPT

## 2022-01-01 PROCEDURE — 770022 HCHG ROOM/CARE - ICU (200)

## 2022-01-01 PROCEDURE — 700111 HCHG RX REV CODE 636 W/ 250 OVERRIDE (IP)

## 2022-01-01 PROCEDURE — 93458 L HRT ARTERY/VENTRICLE ANGIO: CPT | Mod: 26 | Performed by: INTERNAL MEDICINE

## 2022-01-01 PROCEDURE — 99223 1ST HOSP IP/OBS HIGH 75: CPT | Mod: AI | Performed by: STUDENT IN AN ORGANIZED HEALTH CARE EDUCATION/TRAINING PROGRAM

## 2022-01-01 PROCEDURE — 99291 CRITICAL CARE FIRST HOUR: CPT | Performed by: STUDENT IN AN ORGANIZED HEALTH CARE EDUCATION/TRAINING PROGRAM

## 2022-01-01 PROCEDURE — 96366 THER/PROPH/DIAG IV INF ADDON: CPT

## 2022-01-01 PROCEDURE — 93308 TTE F-UP OR LMTD: CPT

## 2022-01-01 PROCEDURE — 81001 URINALYSIS AUTO W/SCOPE: CPT

## 2022-01-01 PROCEDURE — 94640 AIRWAY INHALATION TREATMENT: CPT

## 2022-01-01 PROCEDURE — 33993 REPOSG PERQ R/L HRT VAD: CPT | Performed by: INTERNAL MEDICINE

## 2022-01-01 PROCEDURE — 700102 HCHG RX REV CODE 250 W/ 637 OVERRIDE(OP): Performed by: INTERNAL MEDICINE

## 2022-01-01 PROCEDURE — 85347 COAGULATION TIME ACTIVATED: CPT

## 2022-01-01 PROCEDURE — 82947 ASSAY GLUCOSE BLOOD QUANT: CPT

## 2022-01-01 PROCEDURE — 84145 PROCALCITONIN (PCT): CPT

## 2022-01-01 PROCEDURE — 05HY33Z INSERTION OF INFUSION DEVICE INTO UPPER VEIN, PERCUTANEOUS APPROACH: ICD-10-PCS | Performed by: INTERNAL MEDICINE

## 2022-01-01 PROCEDURE — 92950 HEART/LUNG RESUSCITATION CPR: CPT

## 2022-01-01 PROCEDURE — 82803 BLOOD GASES ANY COMBINATION: CPT

## 2022-01-01 PROCEDURE — 84300 ASSAY OF URINE SODIUM: CPT

## 2022-01-01 PROCEDURE — 83880 ASSAY OF NATRIURETIC PEPTIDE: CPT

## 2022-01-01 PROCEDURE — 700101 HCHG RX REV CODE 250: Performed by: INTERNAL MEDICINE

## 2022-01-01 PROCEDURE — 99291 CRITICAL CARE FIRST HOUR: CPT

## 2022-01-01 PROCEDURE — 4A023N7 MEASUREMENT OF CARDIAC SAMPLING AND PRESSURE, LEFT HEART, PERCUTANEOUS APPROACH: ICD-10-PCS | Performed by: INTERNAL MEDICINE

## 2022-01-01 PROCEDURE — 02HA3RZ INSERTION OF SHORT-TERM EXTERNAL HEART ASSIST SYSTEM INTO HEART, PERCUTANEOUS APPROACH: ICD-10-PCS | Performed by: INTERNAL MEDICINE

## 2022-01-01 PROCEDURE — 83036 HEMOGLOBIN GLYCOSYLATED A1C: CPT

## 2022-01-01 PROCEDURE — 700101 HCHG RX REV CODE 250

## 2022-01-01 PROCEDURE — 36620 INSERTION CATHETER ARTERY: CPT | Performed by: INTERNAL MEDICINE

## 2022-01-01 PROCEDURE — 33990 INSJ PERQ VAD L HRT ARTERIAL: CPT | Performed by: INTERNAL MEDICINE

## 2022-01-01 PROCEDURE — 96375 TX/PRO/DX INJ NEW DRUG ADDON: CPT

## 2022-01-01 PROCEDURE — C1751 CATH, INF, PER/CENT/MIDLINE: HCPCS

## 2022-01-01 PROCEDURE — 0241U HCHG SARS-COV-2 COVID-19 NFCT DS RESP RNA 4 TRGT MIC: CPT

## 2022-01-01 PROCEDURE — 86803 HEPATITIS C AB TEST: CPT

## 2022-01-01 PROCEDURE — 80053 COMPREHEN METABOLIC PANEL: CPT

## 2022-01-01 PROCEDURE — 85610 PROTHROMBIN TIME: CPT | Mod: 91

## 2022-01-01 PROCEDURE — C9248 INJ, CLEVIDIPINE BUTYRATE: HCPCS | Performed by: INTERNAL MEDICINE

## 2022-01-01 PROCEDURE — A9270 NON-COVERED ITEM OR SERVICE: HCPCS | Performed by: INTERNAL MEDICINE

## 2022-01-01 PROCEDURE — 85025 COMPLETE CBC W/AUTO DIFF WBC: CPT | Mod: 91

## 2022-01-01 PROCEDURE — 87086 URINE CULTURE/COLONY COUNT: CPT

## 2022-01-01 PROCEDURE — 302214 INTUBATION BOX: Performed by: INTERNAL MEDICINE

## 2022-01-01 PROCEDURE — 99152 MOD SED SAME PHYS/QHP 5/>YRS: CPT

## 2022-01-01 PROCEDURE — C1894 INTRO/SHEATH, NON-LASER: HCPCS

## 2022-01-01 PROCEDURE — 85379 FIBRIN DEGRADATION QUANT: CPT

## 2022-01-01 PROCEDURE — 93503 INSERT/PLACE HEART CATHETER: CPT

## 2022-01-01 PROCEDURE — 92950 HEART/LUNG RESUSCITATION CPR: CPT | Performed by: STUDENT IN AN ORGANIZED HEALTH CARE EDUCATION/TRAINING PROGRAM

## 2022-01-01 PROCEDURE — 82533 TOTAL CORTISOL: CPT

## 2022-01-01 PROCEDURE — 93503 INSERT/PLACE HEART CATHETER: CPT | Mod: LT | Performed by: INTERNAL MEDICINE

## 2022-01-01 PROCEDURE — 700111 HCHG RX REV CODE 636 W/ 250 OVERRIDE (IP): Performed by: EMERGENCY MEDICINE

## 2022-01-01 PROCEDURE — 82330 ASSAY OF CALCIUM: CPT

## 2022-01-01 PROCEDURE — 700117 HCHG RX CONTRAST REV CODE 255: Performed by: EMERGENCY MEDICINE

## 2022-01-01 PROCEDURE — 5A0221D ASSISTANCE WITH CARDIAC OUTPUT USING IMPELLER PUMP, CONTINUOUS: ICD-10-PCS | Performed by: INTERNAL MEDICINE

## 2022-01-01 PROCEDURE — 84484 ASSAY OF TROPONIN QUANT: CPT | Mod: 91

## 2022-01-01 PROCEDURE — 74175 CTA ABDOMEN W/CONTRAST: CPT

## 2022-01-01 PROCEDURE — 82570 ASSAY OF URINE CREATININE: CPT

## 2022-01-01 PROCEDURE — 99285 EMERGENCY DEPT VISIT HI MDM: CPT

## 2022-01-01 PROCEDURE — 85730 THROMBOPLASTIN TIME PARTIAL: CPT | Mod: 91

## 2022-01-01 PROCEDURE — 94002 VENT MGMT INPAT INIT DAY: CPT

## 2022-01-01 PROCEDURE — 80053 COMPREHEN METABOLIC PANEL: CPT | Mod: 91

## 2022-01-01 PROCEDURE — 85520 HEPARIN ASSAY: CPT | Mod: 91

## 2022-01-01 PROCEDURE — 36620 INSERTION CATHETER ARTERY: CPT

## 2022-01-01 PROCEDURE — 94660 CPAP INITIATION&MGMT: CPT

## 2022-01-01 PROCEDURE — 82803 BLOOD GASES ANY COMBINATION: CPT | Mod: 91

## 2022-01-01 PROCEDURE — 82306 VITAMIN D 25 HYDROXY: CPT

## 2022-01-01 RX ORDER — HEPARIN SODIUM 1000 [USP'U]/ML
30 INJECTION, SOLUTION INTRAVENOUS; SUBCUTANEOUS PRN
Status: DISCONTINUED | OUTPATIENT
Start: 2022-01-01 | End: 2022-01-01

## 2022-01-01 RX ORDER — ONDANSETRON 2 MG/ML
4 INJECTION INTRAMUSCULAR; INTRAVENOUS ONCE
Status: COMPLETED | OUTPATIENT
Start: 2022-01-01 | End: 2022-01-01

## 2022-01-01 RX ORDER — MIDAZOLAM HYDROCHLORIDE 1 MG/ML
5 INJECTION INTRAMUSCULAR; INTRAVENOUS ONCE
Status: COMPLETED | OUTPATIENT
Start: 2022-01-01 | End: 2022-01-01

## 2022-01-01 RX ORDER — EPINEPHRINE HCL IN 0.9 % NACL 4MG/250ML
PLASTIC BAG, INJECTION (ML) INTRAVENOUS
Status: COMPLETED
Start: 2022-01-01 | End: 2022-01-01

## 2022-01-01 RX ORDER — ROCURONIUM BROMIDE 10 MG/ML
60 INJECTION, SOLUTION INTRAVENOUS ONCE
Status: COMPLETED | OUTPATIENT
Start: 2022-01-01 | End: 2022-01-01

## 2022-01-01 RX ORDER — NOREPINEPHRINE BITARTRATE 0.03 MG/ML
0-1 INJECTION, SOLUTION INTRAVENOUS CONTINUOUS
Status: DISCONTINUED | OUTPATIENT
Start: 2022-01-01 | End: 2022-01-01

## 2022-01-01 RX ORDER — ACETAMINOPHEN 650 MG/1
650 SUPPOSITORY RECTAL EVERY 4 HOURS PRN
Status: DISCONTINUED | OUTPATIENT
Start: 2022-01-01 | End: 2022-01-01 | Stop reason: HOSPADM

## 2022-01-01 RX ORDER — NOREPINEPHRINE BITARTRATE 0.03 MG/ML
INJECTION, SOLUTION INTRAVENOUS
Status: COMPLETED
Start: 2022-01-01 | End: 2022-01-01

## 2022-01-01 RX ORDER — ACETAMINOPHEN 325 MG/1
650 TABLET ORAL EVERY 4 HOURS PRN
Status: DISCONTINUED | OUTPATIENT
Start: 2022-01-01 | End: 2022-01-01 | Stop reason: HOSPADM

## 2022-01-01 RX ORDER — NITROGLYCERIN 0.4 MG/1
0.4 TABLET SUBLINGUAL
Status: DISCONTINUED | OUTPATIENT
Start: 2022-01-01 | End: 2022-01-01

## 2022-01-01 RX ORDER — MULTIVIT WITH MINERALS/LUTEIN
1 TABLET ORAL
Status: DISCONTINUED | OUTPATIENT
Start: 2022-01-01 | End: 2022-01-01

## 2022-01-01 RX ORDER — UBIDECARENONE 50 MG
CAPSULE ORAL
Status: SHIPPED | COMMUNITY
Start: 2022-01-01 | End: 2022-01-01

## 2022-01-01 RX ORDER — BISACODYL 10 MG
10 SUPPOSITORY, RECTAL RECTAL
Status: DISCONTINUED | OUTPATIENT
Start: 2022-01-01 | End: 2022-01-01

## 2022-01-01 RX ORDER — ZINC GLUCONATE 50 MG
1 TABLET ORAL
COMMUNITY

## 2022-01-01 RX ORDER — LORAZEPAM 2 MG/ML
INJECTION INTRAMUSCULAR
Status: DISCONTINUED
Start: 2022-01-01 | End: 2022-01-01 | Stop reason: HOSPADM

## 2022-01-01 RX ORDER — IPRATROPIUM BROMIDE AND ALBUTEROL SULFATE 2.5; .5 MG/3ML; MG/3ML
3 SOLUTION RESPIRATORY (INHALATION)
Status: DISCONTINUED | OUTPATIENT
Start: 2022-01-01 | End: 2022-01-01

## 2022-01-01 RX ORDER — FUROSEMIDE 10 MG/ML
INJECTION INTRAMUSCULAR; INTRAVENOUS
Status: COMPLETED
Start: 2022-01-01 | End: 2022-01-01

## 2022-01-01 RX ORDER — ACETAMINOPHEN 325 MG/1
650 TABLET ORAL EVERY 6 HOURS PRN
Status: DISCONTINUED | OUTPATIENT
Start: 2022-01-01 | End: 2022-01-01

## 2022-01-01 RX ORDER — HYDROMORPHONE HYDROCHLORIDE 1 MG/ML
.5-2 INJECTION, SOLUTION INTRAMUSCULAR; INTRAVENOUS; SUBCUTANEOUS
Status: DISCONTINUED | OUTPATIENT
Start: 2022-01-01 | End: 2022-01-01

## 2022-01-01 RX ORDER — FUROSEMIDE 10 MG/ML
40 INJECTION INTRAMUSCULAR; INTRAVENOUS ONCE
Status: COMPLETED | OUTPATIENT
Start: 2022-01-01 | End: 2022-01-01

## 2022-01-01 RX ORDER — HEPARIN SODIUM 1000 [USP'U]/ML
INJECTION, SOLUTION INTRAVENOUS; SUBCUTANEOUS
Status: COMPLETED
Start: 2022-01-01 | End: 2022-01-01

## 2022-01-01 RX ORDER — AZITHROMYCIN 250 MG/1
500 TABLET, FILM COATED ORAL DAILY
Status: DISCONTINUED | OUTPATIENT
Start: 2022-01-01 | End: 2022-01-01

## 2022-01-01 RX ORDER — AMOXICILLIN 250 MG
2 CAPSULE ORAL 2 TIMES DAILY
Status: DISCONTINUED | OUTPATIENT
Start: 2022-01-01 | End: 2022-01-01

## 2022-01-01 RX ORDER — PANTOPRAZOLE SODIUM 40 MG/1
40 TABLET, DELAYED RELEASE ORAL EVERY MORNING
Qty: 30 TABLET | Refills: 2 | Status: SHIPPED | OUTPATIENT
Start: 2022-01-01 | End: 2022-01-01

## 2022-01-01 RX ORDER — POLYETHYLENE GLYCOL 3350 17 G/17G
1 POWDER, FOR SOLUTION ORAL
Status: DISCONTINUED | OUTPATIENT
Start: 2022-01-01 | End: 2022-01-01 | Stop reason: HOSPADM

## 2022-01-01 RX ORDER — EPINEPHRINE HCL IN 0.9 % NACL 4MG/250ML
0-.5 PLASTIC BAG, INJECTION (ML) INTRAVENOUS CONTINUOUS
Status: DISCONTINUED | OUTPATIENT
Start: 2022-01-01 | End: 2022-01-01

## 2022-01-01 RX ORDER — HEPARIN SODIUM 5000 [USP'U]/100ML
0-30 INJECTION, SOLUTION INTRAVENOUS CONTINUOUS
Status: DISCONTINUED | OUTPATIENT
Start: 2022-01-01 | End: 2022-01-01 | Stop reason: HOSPADM

## 2022-01-01 RX ORDER — CITALOPRAM 20 MG/1
10 TABLET ORAL
Status: DISCONTINUED | OUTPATIENT
Start: 2022-01-01 | End: 2022-01-01

## 2022-01-01 RX ORDER — MIDAZOLAM HYDROCHLORIDE 1 MG/ML
1-5 INJECTION INTRAMUSCULAR; INTRAVENOUS
Status: DISCONTINUED | OUTPATIENT
Start: 2022-01-01 | End: 2022-01-01

## 2022-01-01 RX ORDER — AMOXICILLIN 250 MG
2 CAPSULE ORAL 2 TIMES DAILY
Status: DISCONTINUED | OUTPATIENT
Start: 2022-01-01 | End: 2022-01-01 | Stop reason: HOSPADM

## 2022-01-01 RX ORDER — SODIUM CHLORIDE, SODIUM LACTATE, POTASSIUM CHLORIDE, AND CALCIUM CHLORIDE .6; .31; .03; .02 G/100ML; G/100ML; G/100ML; G/100ML
500 INJECTION, SOLUTION INTRAVENOUS ONCE
Status: DISCONTINUED | OUTPATIENT
Start: 2022-01-01 | End: 2022-01-01

## 2022-01-01 RX ORDER — SODIUM CHLORIDE 9 MG/ML
INJECTION, SOLUTION INTRAVENOUS CONTINUOUS
Status: DISCONTINUED | OUTPATIENT
Start: 2022-01-01 | End: 2022-01-01

## 2022-01-01 RX ORDER — ONDANSETRON 4 MG/1
4 TABLET, ORALLY DISINTEGRATING ORAL EVERY 4 HOURS PRN
Status: DISCONTINUED | OUTPATIENT
Start: 2022-01-01 | End: 2022-01-01

## 2022-01-01 RX ORDER — SODIUM CHLORIDE, SODIUM LACTATE, POTASSIUM CHLORIDE, CALCIUM CHLORIDE 600; 310; 30; 20 MG/100ML; MG/100ML; MG/100ML; MG/100ML
INJECTION, SOLUTION INTRAVENOUS CONTINUOUS
Status: DISCONTINUED | OUTPATIENT
Start: 2022-01-01 | End: 2022-01-01

## 2022-01-01 RX ORDER — HEPARIN SODIUM 1000 [USP'U]/ML
60 INJECTION, SOLUTION INTRAVENOUS; SUBCUTANEOUS ONCE
Status: DISCONTINUED | OUTPATIENT
Start: 2022-01-01 | End: 2022-01-01 | Stop reason: HOSPADM

## 2022-01-01 RX ORDER — DIPHENHYDRAMINE HYDROCHLORIDE 50 MG/ML
50 INJECTION INTRAMUSCULAR; INTRAVENOUS ONCE
Status: COMPLETED | OUTPATIENT
Start: 2022-01-01 | End: 2022-01-01

## 2022-01-01 RX ORDER — HEPARIN SODIUM 200 [USP'U]/100ML
INJECTION, SOLUTION INTRAVENOUS
Status: COMPLETED
Start: 2022-01-01 | End: 2022-01-01

## 2022-01-01 RX ORDER — BISACODYL 10 MG
10 SUPPOSITORY, RECTAL RECTAL
Status: DISCONTINUED | OUTPATIENT
Start: 2022-01-01 | End: 2022-01-01 | Stop reason: HOSPADM

## 2022-01-01 RX ORDER — PANTOPRAZOLE SODIUM 40 MG/1
40 TABLET, DELAYED RELEASE ORAL PRN
COMMUNITY

## 2022-01-01 RX ORDER — SODIUM CHLORIDE 9 MG/ML
INJECTION, SOLUTION INTRAVENOUS CONTINUOUS
Status: ACTIVE | OUTPATIENT
Start: 2022-01-01 | End: 2022-01-01

## 2022-01-01 RX ORDER — EPINEPHRINE 0.1 MG/ML
1 SYRINGE (ML) INJECTION ONCE
Status: CANCELLED | OUTPATIENT
Start: 2022-01-01 | End: 2022-01-01

## 2022-01-01 RX ORDER — DEXMEDETOMIDINE HYDROCHLORIDE 4 UG/ML
0-1.5 INJECTION, SOLUTION INTRAVENOUS CONTINUOUS
Status: DISCONTINUED | OUTPATIENT
Start: 2022-01-01 | End: 2022-01-01 | Stop reason: HOSPADM

## 2022-01-01 RX ORDER — ATORVASTATIN CALCIUM 80 MG/1
80 TABLET, FILM COATED ORAL EVERY EVENING
Status: DISCONTINUED | OUTPATIENT
Start: 2022-01-01 | End: 2022-01-01

## 2022-01-01 RX ORDER — AZITHROMYCIN 500 MG/1
500 INJECTION, POWDER, LYOPHILIZED, FOR SOLUTION INTRAVENOUS ONCE
Status: COMPLETED | OUTPATIENT
Start: 2022-01-01 | End: 2022-01-01

## 2022-01-01 RX ORDER — ONDANSETRON 4 MG/1
8 TABLET, ORALLY DISINTEGRATING ORAL EVERY 8 HOURS PRN
Status: DISCONTINUED | OUTPATIENT
Start: 2022-01-01 | End: 2022-01-01 | Stop reason: HOSPADM

## 2022-01-01 RX ORDER — GUAIFENESIN/DEXTROMETHORPHAN 100-10MG/5
10 SYRUP ORAL EVERY 6 HOURS PRN
Status: DISCONTINUED | OUTPATIENT
Start: 2022-01-01 | End: 2022-01-01

## 2022-01-01 RX ORDER — EPINEPHRINE 0.1 MG/ML
SYRINGE (ML) INJECTION
Status: DISCONTINUED | OUTPATIENT
Start: 2022-01-01 | End: 2022-01-01

## 2022-01-01 RX ORDER — VERAPAMIL HYDROCHLORIDE 2.5 MG/ML
INJECTION, SOLUTION INTRAVENOUS
Status: COMPLETED
Start: 2022-01-01 | End: 2022-01-01

## 2022-01-01 RX ORDER — HEPARIN SODIUM 1000 [USP'U]/ML
40 INJECTION, SOLUTION INTRAVENOUS; SUBCUTANEOUS PRN
Status: DISCONTINUED | OUTPATIENT
Start: 2022-01-01 | End: 2022-01-01

## 2022-01-01 RX ORDER — MORPHINE SULFATE 4 MG/ML
4 INJECTION INTRAVENOUS
Status: DISCONTINUED | OUTPATIENT
Start: 2022-01-01 | End: 2022-01-01 | Stop reason: HOSPADM

## 2022-01-01 RX ORDER — MORPHINE SULFATE 4 MG/ML
2-4 INJECTION INTRAVENOUS
Status: DISCONTINUED | OUTPATIENT
Start: 2022-01-01 | End: 2022-01-01

## 2022-01-01 RX ORDER — POLYETHYLENE GLYCOL 3350 17 G/17G
1 POWDER, FOR SOLUTION ORAL
Status: DISCONTINUED | OUTPATIENT
Start: 2022-01-01 | End: 2022-01-01

## 2022-01-01 RX ORDER — HEPARIN SODIUM 1000 [USP'U]/ML
60 INJECTION, SOLUTION INTRAVENOUS; SUBCUTANEOUS ONCE
Status: COMPLETED | OUTPATIENT
Start: 2022-01-01 | End: 2022-01-01

## 2022-01-01 RX ORDER — ONDANSETRON 2 MG/ML
4 INJECTION INTRAMUSCULAR; INTRAVENOUS EVERY 4 HOURS PRN
Status: DISCONTINUED | OUTPATIENT
Start: 2022-01-01 | End: 2022-01-01

## 2022-01-01 RX ORDER — ONDANSETRON 2 MG/ML
8 INJECTION INTRAMUSCULAR; INTRAVENOUS EVERY 8 HOURS PRN
Status: DISCONTINUED | OUTPATIENT
Start: 2022-01-01 | End: 2022-01-01 | Stop reason: HOSPADM

## 2022-01-01 RX ORDER — LIDOCAINE HYDROCHLORIDE 20 MG/ML
INJECTION, SOLUTION INFILTRATION; PERINEURAL
Status: COMPLETED
Start: 2022-01-01 | End: 2022-01-01

## 2022-01-01 RX ORDER — MULTIVIT WITH MINERALS/LUTEIN
1 TABLET ORAL
COMMUNITY

## 2022-01-01 RX ORDER — HEPARIN SODIUM 5000 [USP'U]/100ML
0-1000 INJECTION, SOLUTION INTRAVENOUS CONTINUOUS
Status: DISCONTINUED | OUTPATIENT
Start: 2022-01-01 | End: 2022-01-01

## 2022-01-01 RX ORDER — HEPARIN SODIUM 5000 [USP'U]/100ML
0-30 INJECTION, SOLUTION INTRAVENOUS CONTINUOUS
Status: DISCONTINUED | OUTPATIENT
Start: 2022-01-01 | End: 2022-01-01

## 2022-01-01 RX ORDER — CEFTRIAXONE 1 G/1
1000 INJECTION, POWDER, FOR SOLUTION INTRAMUSCULAR; INTRAVENOUS ONCE
Status: COMPLETED | OUTPATIENT
Start: 2022-01-01 | End: 2022-01-01

## 2022-01-01 RX ORDER — CITALOPRAM HYDROBROMIDE 10 MG/1
1 TABLET ORAL
COMMUNITY

## 2022-01-01 RX ORDER — LORAZEPAM 2 MG/ML
1 CONCENTRATE ORAL
Status: DISCONTINUED | OUTPATIENT
Start: 2022-01-01 | End: 2022-01-01 | Stop reason: HOSPADM

## 2022-01-01 RX ORDER — EPINEPHRINE 0.1 MG/ML
0.2 SYRINGE (ML) INJECTION ONCE
Status: DISCONTINUED | OUTPATIENT
Start: 2022-01-01 | End: 2022-01-01

## 2022-01-01 RX ORDER — MIDAZOLAM HYDROCHLORIDE 1 MG/ML
INJECTION INTRAMUSCULAR; INTRAVENOUS
Status: COMPLETED
Start: 2022-01-01 | End: 2022-01-01

## 2022-01-01 RX ORDER — PANTOPRAZOLE SODIUM 40 MG/10ML
40 INJECTION, POWDER, LYOPHILIZED, FOR SOLUTION INTRAVENOUS 2 TIMES DAILY
Status: DISCONTINUED | OUTPATIENT
Start: 2022-01-01 | End: 2022-01-01

## 2022-01-01 RX ORDER — NITROGLYCERIN 20 MG/100ML
0-200 INJECTION INTRAVENOUS CONTINUOUS
Status: DISCONTINUED | OUTPATIENT
Start: 2022-01-01 | End: 2022-01-01

## 2022-01-01 RX ORDER — HYDRALAZINE HYDROCHLORIDE 20 MG/ML
10 INJECTION INTRAMUSCULAR; INTRAVENOUS EVERY 4 HOURS PRN
Status: DISCONTINUED | OUTPATIENT
Start: 2022-01-01 | End: 2022-01-01 | Stop reason: HOSPADM

## 2022-01-01 RX ORDER — ASPIRIN 81 MG/1
324 TABLET, CHEWABLE ORAL ONCE
Status: COMPLETED | OUTPATIENT
Start: 2022-01-01 | End: 2022-01-01

## 2022-01-01 RX ORDER — HEPARIN SODIUM 1000 [USP'U]/ML
30 INJECTION, SOLUTION INTRAVENOUS; SUBCUTANEOUS PRN
Status: DISCONTINUED | OUTPATIENT
Start: 2022-01-01 | End: 2022-01-01 | Stop reason: HOSPADM

## 2022-01-01 RX ORDER — LORAZEPAM 2 MG/ML
1 INJECTION INTRAMUSCULAR
Status: DISCONTINUED | OUTPATIENT
Start: 2022-01-01 | End: 2022-01-01 | Stop reason: HOSPADM

## 2022-01-01 RX ORDER — PANTOPRAZOLE SODIUM 40 MG/1
40 TABLET, DELAYED RELEASE ORAL DAILY
Status: DISCONTINUED | OUTPATIENT
Start: 2022-01-01 | End: 2022-01-01

## 2022-01-01 RX ORDER — CALCIUM CARBONATE 300MG(750)
1 TABLET,CHEWABLE ORAL DAILY
COMMUNITY

## 2022-01-01 RX ORDER — ATROPINE SULFATE 10 MG/ML
2 SOLUTION/ DROPS OPHTHALMIC EVERY 4 HOURS PRN
Status: DISCONTINUED | OUTPATIENT
Start: 2022-01-01 | End: 2022-01-01 | Stop reason: HOSPADM

## 2022-01-01 RX ORDER — SODIUM CHLORIDE, SODIUM LACTATE, POTASSIUM CHLORIDE, AND CALCIUM CHLORIDE .6; .31; .03; .02 G/100ML; G/100ML; G/100ML; G/100ML
500 INJECTION, SOLUTION INTRAVENOUS
Status: DISCONTINUED | OUTPATIENT
Start: 2022-01-01 | End: 2022-01-01

## 2022-01-01 RX ORDER — FAMOTIDINE 20 MG/1
20 TABLET, FILM COATED ORAL DAILY
Status: DISCONTINUED | OUTPATIENT
Start: 2022-01-01 | End: 2022-01-01 | Stop reason: ALTCHOICE

## 2022-01-01 RX ORDER — ETOMIDATE 2 MG/ML
20 INJECTION INTRAVENOUS ONCE
Status: COMPLETED | OUTPATIENT
Start: 2022-01-01 | End: 2022-01-01

## 2022-01-01 RX ADMIN — FUROSEMIDE 40 MG: 10 INJECTION INTRAMUSCULAR; INTRAVENOUS at 08:38

## 2022-01-01 RX ADMIN — IOHEXOL 26 ML: 350 INJECTION, SOLUTION INTRAVENOUS at 16:32

## 2022-01-01 RX ADMIN — ONDANSETRON 4 MG: 2 INJECTION INTRAMUSCULAR; INTRAVENOUS at 10:23

## 2022-01-01 RX ADMIN — DEXMEDETOMIDINE 0.2 MCG/KG/HR: 200 INJECTION, SOLUTION INTRAVENOUS at 12:31

## 2022-01-01 RX ADMIN — LIDOCAINE HYDROCHLORIDE: 20 INJECTION, SOLUTION INFILTRATION; PERINEURAL at 16:10

## 2022-01-01 RX ADMIN — SODIUM CHLORIDE: 9 INJECTION, SOLUTION INTRAVENOUS at 17:28

## 2022-01-01 RX ADMIN — CLEVIPIDINE 2 MG/HR: 0.5 EMULSION INTRAVENOUS at 20:18

## 2022-01-01 RX ADMIN — NITROGLYCERIN 10 ML: 20 INJECTION INTRAVENOUS at 16:11

## 2022-01-01 RX ADMIN — DOXYCYCLINE 100 MG: 100 INJECTION, POWDER, LYOPHILIZED, FOR SOLUTION INTRAVENOUS at 18:06

## 2022-01-01 RX ADMIN — GUAIFENESIN AND DEXTROMETHORPHAN 10 ML: 100; 10 SYRUP ORAL at 08:14

## 2022-01-01 RX ADMIN — THIAMINE HYDROCHLORIDE 500 MG: 100 INJECTION, SOLUTION INTRAMUSCULAR; INTRAVENOUS at 18:04

## 2022-01-01 RX ADMIN — HEPARIN SODIUM 3500 UNITS: 1000 INJECTION, SOLUTION INTRAVENOUS; SUBCUTANEOUS at 05:01

## 2022-01-01 RX ADMIN — MIDAZOLAM HYDROCHLORIDE 5 MG: 1 INJECTION INTRAMUSCULAR; INTRAVENOUS at 17:56

## 2022-01-01 RX ADMIN — PANTOPRAZOLE SODIUM 40 MG: 40 INJECTION, POWDER, LYOPHILIZED, FOR SOLUTION INTRAVENOUS at 18:13

## 2022-01-01 RX ADMIN — PANTOPRAZOLE SODIUM 40 MG: 40 INJECTION, POWDER, LYOPHILIZED, FOR SOLUTION INTRAVENOUS at 11:34

## 2022-01-01 RX ADMIN — LORAZEPAM 1 MG: 2 INJECTION INTRAMUSCULAR; INTRAVENOUS at 00:50

## 2022-01-01 RX ADMIN — NITROGLYCERIN 0.4 MG: 0.4 TABLET, ORALLY DISINTEGRATING SUBLINGUAL at 08:44

## 2022-01-01 RX ADMIN — MIDAZOLAM HYDROCHLORIDE 5 MG: 1 INJECTION, SOLUTION INTRAMUSCULAR; INTRAVENOUS at 13:40

## 2022-01-01 RX ADMIN — NOREPINEPHRINE BITARTRATE 0.05 MCG/KG/MIN: 1 INJECTION, SOLUTION, CONCENTRATE INTRAVENOUS at 14:46

## 2022-01-01 RX ADMIN — ACETAMINOPHEN 650 MG: 325 TABLET, FILM COATED ORAL at 20:28

## 2022-01-01 RX ADMIN — DOBUTAMINE HYDROCHLORIDE 5 MCG/KG/MIN: 100 INJECTION INTRAVENOUS at 14:54

## 2022-01-01 RX ADMIN — ATORVASTATIN CALCIUM 80 MG: 80 TABLET, FILM COATED ORAL at 23:00

## 2022-01-01 RX ADMIN — ONDANSETRON 4 MG: 4 TABLET, ORALLY DISINTEGRATING ORAL at 08:14

## 2022-01-01 RX ADMIN — CEFTRIAXONE SODIUM 1000 MG: 1 INJECTION, POWDER, FOR SOLUTION INTRAMUSCULAR; INTRAVENOUS at 06:08

## 2022-01-01 RX ADMIN — ETOMIDATE 20 MG: 2 INJECTION INTRAVENOUS at 12:36

## 2022-01-01 RX ADMIN — Medication 0.3 MCG/KG/MIN: at 15:45

## 2022-01-01 RX ADMIN — AZITHROMYCIN MONOHYDRATE 500 MG: 500 INJECTION, POWDER, LYOPHILIZED, FOR SOLUTION INTRAVENOUS at 06:11

## 2022-01-01 RX ADMIN — ASPIRIN 81 MG 324 MG: 81 TABLET ORAL at 02:30

## 2022-01-01 RX ADMIN — IOHEXOL 100 ML: 350 INJECTION, SOLUTION INTRAVENOUS at 05:15

## 2022-01-01 RX ADMIN — SODIUM BICARBONATE 100 MEQ: 84 INJECTION, SOLUTION INTRAVENOUS at 16:01

## 2022-01-01 RX ADMIN — HYDROMORPHONE HYDROCHLORIDE 2 MG: 1 INJECTION, SOLUTION INTRAMUSCULAR; INTRAVENOUS; SUBCUTANEOUS at 19:55

## 2022-01-01 RX ADMIN — Medication 1 MG: at 00:15

## 2022-01-01 RX ADMIN — HEPARIN SODIUM 317.5 UNITS/HR: 1000 INJECTION, SOLUTION INTRAVENOUS; SUBCUTANEOUS at 19:04

## 2022-01-01 RX ADMIN — EPINEPHRINE 1 MG: 0.1 INJECTION, SOLUTION INTRAVENOUS at 00:12

## 2022-01-01 RX ADMIN — VERAPAMIL HYDROCHLORIDE 5 MG: 2.5 INJECTION, SOLUTION INTRAVENOUS at 16:11

## 2022-01-01 RX ADMIN — NITROGLYCERIN 10 MCG/MIN: 20 INJECTION INTRAVENOUS at 09:01

## 2022-01-01 RX ADMIN — HEPARIN SODIUM 2000 UNITS: 200 INJECTION, SOLUTION INTRAVENOUS at 16:11

## 2022-01-01 RX ADMIN — SODIUM CHLORIDE, POTASSIUM CHLORIDE, SODIUM LACTATE AND CALCIUM CHLORIDE 500 ML: 600; 310; 30; 20 INJECTION, SOLUTION INTRAVENOUS at 00:45

## 2022-01-01 RX ADMIN — DOBUTAMINE HYDROCHLORIDE 5 MCG/KG/MIN: 100 INJECTION INTRAVENOUS at 17:48

## 2022-01-01 RX ADMIN — NOREPINEPHRINE BITARTRATE 0.05 MCG/KG/MIN: 0.03 INJECTION, SOLUTION INTRAVENOUS at 14:46

## 2022-01-01 RX ADMIN — FUROSEMIDE 40 MG: 10 INJECTION, SOLUTION INTRAMUSCULAR; INTRAVENOUS at 11:34

## 2022-01-01 RX ADMIN — HYDROMORPHONE HYDROCHLORIDE 1 MG: 1 INJECTION, SOLUTION INTRAMUSCULAR; INTRAVENOUS; SUBCUTANEOUS at 13:04

## 2022-01-01 RX ADMIN — ONDANSETRON 4 MG: 2 INJECTION INTRAMUSCULAR; INTRAVENOUS at 01:45

## 2022-01-01 RX ADMIN — EPINEPHRINE 0.2 MG: 0.1 INJECTION, SOLUTION INTRAVENOUS at 00:45

## 2022-01-01 RX ADMIN — MORPHINE SULFATE 6 MG: 10 INJECTION INTRAVENOUS at 00:50

## 2022-01-01 RX ADMIN — MINERAL OIL, PETROLATUM 1 APPLICATION: 425; 573 OINTMENT OPHTHALMIC at 23:00

## 2022-01-01 RX ADMIN — MIDAZOLAM HYDROCHLORIDE 5 MG: 1 INJECTION INTRAMUSCULAR; INTRAVENOUS at 13:40

## 2022-01-01 RX ADMIN — HYDROMORPHONE HYDROCHLORIDE 0.5 MG/HR: 10 INJECTION, SOLUTION INTRAMUSCULAR; INTRAVENOUS; SUBCUTANEOUS at 12:41

## 2022-01-01 RX ADMIN — HEPARIN SODIUM 12 UNITS/KG/HR: 5000 INJECTION, SOLUTION INTRAVENOUS at 05:05

## 2022-01-01 RX ADMIN — FUROSEMIDE 40 MG: 10 INJECTION, SOLUTION INTRAMUSCULAR; INTRAVENOUS at 08:38

## 2022-01-01 RX ADMIN — HUMAN ALBUMIN MICROSPHERES AND PERFLUTREN 3 ML: 10; .22 INJECTION, SOLUTION INTRAVENOUS at 08:04

## 2022-01-01 RX ADMIN — DEXMEDETOMIDINE 1.5 MCG/KG/HR: 200 INJECTION, SOLUTION INTRAVENOUS at 19:46

## 2022-01-01 RX ADMIN — HYDRALAZINE HYDROCHLORIDE 10 MG: 20 INJECTION INTRAMUSCULAR; INTRAVENOUS at 19:32

## 2022-01-01 RX ADMIN — EPINEPHRINE 0.3 MCG/KG/MIN: 1 INJECTION INTRAMUSCULAR; INTRAVENOUS; SUBCUTANEOUS at 15:45

## 2022-01-01 RX ADMIN — DIPHENHYDRAMINE HYDROCHLORIDE 50 MG: 50 INJECTION INTRAMUSCULAR; INTRAVENOUS at 08:39

## 2022-01-01 RX ADMIN — ROCURONIUM BROMIDE 60 MG: 50 INJECTION INTRAVENOUS at 12:37

## 2022-01-01 RX ADMIN — HEPARIN SODIUM: 1000 INJECTION, SOLUTION INTRAVENOUS; SUBCUTANEOUS at 16:12

## 2022-01-01 RX ADMIN — MIDAZOLAM HYDROCHLORIDE 5 MG: 1 INJECTION, SOLUTION INTRAMUSCULAR; INTRAVENOUS at 17:56

## 2022-01-01 RX ADMIN — MIDAZOLAM HYDROCHLORIDE 5 MG: 1 INJECTION, SOLUTION INTRAMUSCULAR; INTRAVENOUS at 12:44

## 2022-01-01 ASSESSMENT — FIBROSIS 4 INDEX: FIB4 SCORE: 2.41

## 2022-01-01 ASSESSMENT — ENCOUNTER SYMPTOMS
FOCAL WEAKNESS: 0
EYE PAIN: 0
FEVER: 0
CHILLS: 0
HEADACHES: 0
NERVOUS/ANXIOUS: 0
PALPITATIONS: 0
WHEEZING: 0
EYE DISCHARGE: 0
HEARTBURN: 1
BLURRED VISION: 0
VOMITING: 1
ABDOMINAL PAIN: 0
BRUISES/BLEEDS EASILY: 0
COUGH: 0
DEPRESSION: 0
DOUBLE VISION: 0
FATIGUE: 1
MYALGIAS: 0
DIZZINESS: 0
SHORTNESS OF BREATH: 1
NAUSEA: 1
COUGH: 1
WEAKNESS: 1

## 2022-01-01 ASSESSMENT — LIFESTYLE VARIABLES: SUBSTANCE_ABUSE: 0

## 2022-01-01 ASSESSMENT — PAIN DESCRIPTION - PAIN TYPE
TYPE: ACUTE PAIN

## 2022-01-01 ASSESSMENT — PULMONARY FUNCTION TESTS: EPAP_CMH2O: 10

## 2022-04-20 ENCOUNTER — RX ONLY (OUTPATIENT)
Age: 72
Setting detail: RX ONLY
End: 2022-04-20

## 2022-04-20 RX ORDER — TRETIONIN 0.25 MG/G
1 CREAM TOPICAL QD
Qty: 45 | Refills: 3 | Status: ERX

## 2022-09-12 ENCOUNTER — APPOINTMENT (RX ONLY)
Dept: URBAN - METROPOLITAN AREA CLINIC 4 | Facility: CLINIC | Age: 72
Setting detail: DERMATOLOGY
End: 2022-09-12

## 2022-09-12 DIAGNOSIS — L81.4 OTHER MELANIN HYPERPIGMENTATION: ICD-10-CM

## 2022-09-12 DIAGNOSIS — L82.0 INFLAMED SEBORRHEIC KERATOSIS: ICD-10-CM

## 2022-09-12 DIAGNOSIS — D18.0 HEMANGIOMA: ICD-10-CM

## 2022-09-12 DIAGNOSIS — D22 MELANOCYTIC NEVI: ICD-10-CM

## 2022-09-12 DIAGNOSIS — L82.1 OTHER SEBORRHEIC KERATOSIS: ICD-10-CM

## 2022-09-12 DIAGNOSIS — L57.8 OTHER SKIN CHANGES DUE TO CHRONIC EXPOSURE TO NONIONIZING RADIATION: ICD-10-CM

## 2022-09-12 DIAGNOSIS — Z85.828 PERSONAL HISTORY OF OTHER MALIGNANT NEOPLASM OF SKIN: ICD-10-CM

## 2022-09-12 DIAGNOSIS — L57.0 ACTINIC KERATOSIS: ICD-10-CM

## 2022-09-12 PROBLEM — D22.5 MELANOCYTIC NEVI OF TRUNK: Status: ACTIVE | Noted: 2022-09-12

## 2022-09-12 PROBLEM — D18.01 HEMANGIOMA OF SKIN AND SUBCUTANEOUS TISSUE: Status: ACTIVE | Noted: 2022-09-12

## 2022-09-12 PROCEDURE — 17110 DESTRUCTION B9 LES UP TO 14: CPT

## 2022-09-12 PROCEDURE — 99213 OFFICE O/P EST LOW 20 MIN: CPT | Mod: 25

## 2022-09-12 PROCEDURE — ? LIQUID NITROGEN

## 2022-09-12 PROCEDURE — ? COUNSELING

## 2022-09-12 PROCEDURE — 17000 DESTRUCT PREMALG LESION: CPT | Mod: 59

## 2022-09-12 PROCEDURE — 17003 DESTRUCT PREMALG LES 2-14: CPT | Mod: 59

## 2022-09-12 ASSESSMENT — LOCATION DETAILED DESCRIPTION DERM
LOCATION DETAILED: RIGHT INFERIOR CENTRAL MALAR CHEEK
LOCATION DETAILED: NASAL DORSUM
LOCATION DETAILED: LEFT SUPERIOR UPPER BACK
LOCATION DETAILED: RIGHT SUPERIOR UPPER BACK
LOCATION DETAILED: LEFT INFERIOR MEDIAL MALAR CHEEK
LOCATION DETAILED: RIGHT INFERIOR ANTERIOR NECK
LOCATION DETAILED: LEFT RADIAL DORSAL HAND
LOCATION DETAILED: RIGHT RADIAL DORSAL HAND
LOCATION DETAILED: RIGHT SUPERIOR LATERAL MALAR CHEEK
LOCATION DETAILED: RIGHT INFERIOR MEDIAL UPPER BACK

## 2022-09-12 ASSESSMENT — LOCATION SIMPLE DESCRIPTION DERM
LOCATION SIMPLE: RIGHT ANTERIOR NECK
LOCATION SIMPLE: RIGHT CHEEK
LOCATION SIMPLE: RIGHT HAND
LOCATION SIMPLE: RIGHT UPPER BACK
LOCATION SIMPLE: NOSE
LOCATION SIMPLE: LEFT CHEEK
LOCATION SIMPLE: LEFT HAND
LOCATION SIMPLE: LEFT UPPER BACK

## 2022-09-12 ASSESSMENT — LOCATION ZONE DERM
LOCATION ZONE: FACE
LOCATION ZONE: NECK
LOCATION ZONE: HAND
LOCATION ZONE: TRUNK
LOCATION ZONE: NOSE

## 2022-09-12 NOTE — PROCEDURE: LIQUID NITROGEN
Show Aperture Variable?: Yes
Consent: The patient's consent was obtained including but not limited to risks of crusting, scabbing, blistering, scarring, darker or lighter pigmentary change, recurrence, incomplete removal and infection.
Aperture Size (Optional): C
Detail Level: Simple
Render Post-Care Instructions In Note?: no
Number Of Freeze-Thaw Cycles: 2 freeze-thaw cycles
Post-Care Instructions: I reviewed with the patient in detail post-care instructions. Patient is to wear sunprotection, and avoid picking at any of the treated lesions. Pt may apply Vaseline to crusted or scabbing areas.
Duration Of Freeze Thaw-Cycle (Seconds): 3
Number Of Freeze-Thaw Cycles: 1 freeze-thaw cycle
Spray Paint Text: The liquid nitrogen was applied to the skin utilizing a spray paint frosting technique.
Medical Necessity Clause: This procedure was medically necessary because the lesions that were treated were:
Medical Necessity Information: It is in your best interest to select a reason for this procedure from the list below. All of these items fulfill various CMS LCD requirements except the new and changing color options.
Detail Level: Detailed

## 2022-09-12 NOTE — PROCEDURE: MIPS QUALITY
Quality 226: Preventive Care And Screening: Tobacco Use: Screening And Cessation Intervention: Patient screened for tobacco use and is an ex/non-smoker
Quality 111:Pneumonia Vaccination Status For Older Adults: Pneumococcal vaccine (PPSV23) administered on or after patient’s 60th birthday and before the end of the measurement period
Detail Level: Detailed
Quality 130: Documentation Of Current Medications In The Medical Record: Current Medications Documented

## 2022-12-13 PROBLEM — I21.4 NSTEMI (NON-ST ELEVATED MYOCARDIAL INFARCTION) (HCC): Status: ACTIVE | Noted: 2022-01-01

## 2022-12-13 PROBLEM — E87.20 LACTIC ACIDOSIS: Status: ACTIVE | Noted: 2022-01-01

## 2022-12-13 PROBLEM — I50.21 CHF (CONGESTIVE HEART FAILURE), NYHA CLASS I, ACUTE, SYSTOLIC (HCC): Status: ACTIVE | Noted: 2022-01-01

## 2022-12-13 PROBLEM — I50.21 ACUTE SYSTOLIC CONGESTIVE HEART FAILURE (HCC): Status: ACTIVE | Noted: 2022-01-01

## 2022-12-13 PROBLEM — K21.9 GERD (GASTROESOPHAGEAL REFLUX DISEASE): Status: ACTIVE | Noted: 2022-01-01

## 2022-12-13 PROBLEM — A41.9 SEVERE SEPSIS (HCC): Status: ACTIVE | Noted: 2022-01-01

## 2022-12-13 PROBLEM — R73.9 HYPERGLYCEMIA: Status: ACTIVE | Noted: 2022-01-01

## 2022-12-13 PROBLEM — J96.01 ACUTE RESPIRATORY FAILURE WITH HYPOXIA (HCC): Status: ACTIVE | Noted: 2022-01-01

## 2022-12-13 PROBLEM — D72.829 LEUKOCYTOSIS: Status: ACTIVE | Noted: 2022-01-01

## 2022-12-13 PROBLEM — R65.20 SEVERE SEPSIS (HCC): Status: ACTIVE | Noted: 2022-01-01

## 2022-12-13 PROBLEM — R57.0 CARDIOGENIC SHOCK (HCC): Status: ACTIVE | Noted: 2022-01-01

## 2022-12-13 PROBLEM — Z71.89 GOALS OF CARE, COUNSELING/DISCUSSION: Status: ACTIVE | Noted: 2022-01-01

## 2022-12-13 PROBLEM — I20.0 UNSTABLE ANGINA (HCC): Status: ACTIVE | Noted: 2022-01-01

## 2022-12-13 NOTE — PROGRESS NOTES
CRITICAL CARE MEDICINE ATTENDING PROGRESS NOTE    Date of admission  12/13/2022    Chief Complaint  72 y.o. female admitted 12/13/2022 with acute systolic heart failure.    Hospital Course    Interval Problem Update  Reviewed last 24 hour events:      I assumed care of this lady when she arrived in the CICU from the ED  Goals of discussion care was held and CODE STATUS changed to full code  Sinus tachycardia  Emergently intubated for cardiogenic shock  I placed a pulmonary artery catheter and arterial catheter  Full mechanical ventilatory support  Initial hemodynamics from PA catheter consistent with cardiogenic shock with cardiac index of 1.1 and very high SVR  Initially started dobutamine at fixed dose of 5 mcg/kg/min  She required pushes of IV epinephrine (10 mcg/mL) to maintain her blood pressure  I subsequently stopped dobutamine and switched her to an epinephrine infusion  I administered IV bicarbonate solution for acidemia  Her blood pressure responded nicely to IV bicarbonate solution  Plan is to go to cardiac catheterization laboratory for Impella placement      Review of Systems  Review of Systems   Unable to perform ROS: Acuity of condition     Vital Signs for the last 24 hours  Temp:  [36.7 °C (98 °F)] 36.7 °C (98 °F)  Pulse:  [] 125  Resp:  [17-41] 22  BP: (104-177)/(52-93) 123/68  SpO2:  [78 %-100 %] 99 %    Hemodynamic parameters for the last 24 hours       Vent Settings for the last 24 hours  Vent Mode: APVCMV  Rate (breaths/min): 26  Vt Target (mL): 460  PEEP/CPAP: 8  MAP: 12  Control VTE (exp VT): 459    Physical Exam  Physical Exam  Constitutional:       Appearance: She is ill-appearing.   HENT:      Head: Normocephalic.      Mouth/Throat:      Mouth: Mucous membranes are dry.   Eyes:      Pupils: Pupils are equal, round, and reactive to light.   Cardiovascular:      Comments: Sinus tachycardia  Pulmonary:      Breath sounds: Rales (Crackles bilaterally) present. No wheezing.   Abdominal:       General: There is no distension.      Tenderness: There is no abdominal tenderness.   Musculoskeletal:      Right lower leg: No edema.      Left lower leg: No edema.   Skin:     Capillary Refill: Capillary refill takes more than 3 seconds.      Comments: Cold hands, fingers, feet and toes   Neurological:      General: No focal deficit present.      Mental Status: She is oriented to person, place, and time.      Cranial Nerves: No cranial nerve deficit.       Medications  Current Facility-Administered Medications   Medication Dose Route Frequency Provider Last Rate Last Admin    heparin infusion 25,000 units in 500 mL 0.45% NACL  0-30 Units/kg/hr (Adjusted) Intravenous Continuous Joe Billings M.D. 14.2 mL/hr at 12/13/22 0505 12 Units/kg/hr at 12/13/22 0505    heparin injection 1,800 Units  30 Units/kg (Adjusted) Intravenous PRN Darren Marshall        acetaminophen (Tylenol) tablet 650 mg  650 mg Oral Q6HRS PRN Joe Billings M.D.        hydrALAZINE (APRESOLINE) injection 10 mg  10 mg Intravenous Q4HRS PRN Joe Billings M.D.        aspirin EC (ECOTRIN) tablet 81 mg  81 mg Oral DAILY Joe Billings M.D.        nitroglycerin (NITROSTAT) tablet 0.4 mg  0.4 mg Sublingual Q5 MIN PRN Joe Billings M.D.   0.4 mg at 12/13/22 0844    atorvastatin (LIPITOR) tablet 80 mg  80 mg Oral Q EVENING Joe Billings M.D.        [START ON 12/14/2022] citalopram (CeleXA) tablet 10 mg  10 mg Oral Q48HRS Joe Billings M.D.        thiamine (B-1) 500 mg in dextrose 5% 100 mL IVPB  500 mg Intravenous TID Joe Billings M.D.        [START ON 12/14/2022] cefTRIAXone (Rocephin) syringe 2,000 mg  2,000 mg Intravenous Q24HRS Joe Billings M.D.        lidocaine (XYLOCAINE) 1 % injection 0.5 mL  0.5 mL Intradermal Once PRN Yogi Judd M.D.        pantoprazole (Protonix) injection 40 mg  40 mg Intravenous BID Joe Billings M.D.   40 mg at 12/13/22 1134    Respiratory Therapy Consult   Nebulization Continuous RT Yogi Rowan M.D.         senna-docusate (PERICOLACE or SENOKOT S) 8.6-50 MG per tablet 2 Tablet  2 Tablet Enteral Tube BID Yogi Rowan M.D.        And    polyethylene glycol/lytes (MIRALAX) PACKET 1 Packet  1 Packet Enteral Tube QDAY PRN Yogi Rowan M.D.        And    magnesium hydroxide (MILK OF MAGNESIA) suspension 30 mL  30 mL Enteral Tube QDAY PRN Yogi Rowan M.D.        And    bisacodyl (DULCOLAX) suppository 10 mg  10 mg Rectal QDAY PRN Yogi Rowan M.D. MD Alert...ICU Electrolyte Replacement per Pharmacy   Other PHARMACY TO DOSE Yogi Rowan M.D.        lidocaine (XYLOCAINE) 1 % injection 2 mL  2 mL Tracheal Tube Q30 MIN PRN Yogi Rowan M.D.        dexmedetomidine (PRECEDEX) 400 mcg/100mL NS premix infusion  0-1.5 mcg/kg/hr (Ideal) Intravenous Continuous Yogi Rowan M.D. 2.6 mL/hr at 12/13/22 1231 0.2 mcg/kg/hr at 12/13/22 1231    ipratropium-albuterol (DUONEB) nebulizer solution  3 mL Nebulization Q2HRS PRN (RT) Yogi Rowan M.D.        HYDROmorphone (DILAUDID) 0.2 mg/mL in 50mL NS (Continuous Infusion)   Intravenous Continuous Yogi Rowan M.D. 2.5 mL/hr at 12/13/22 1241 0.5 mg/hr at 12/13/22 1241    HYDROmorphone (Dilaudid) injection 0.5-2 mg  0.5-2 mg Intravenous Q HOUR PRN Yogi Rowan M.D.   1 mg at 12/13/22 1304    insulin regular (HumuLIN R,NovoLIN R) injection  2-9 Units Subcutaneous Q6HRS Yogi Rowan M.D.        And    dextrose 10 % BOLUS 25 g  25 g Intravenous Q15 MIN PRN Yogi Rowan M.D.        EPINEPHRINE HCL-NACL 4-0.9 MG/250ML-% IV SOLN             EPINEPHrine (Adrenalin) infusion 4 mg/250 mL (premix)  0-0.5 mcg/kg/min (Ideal) Intravenous Continuous Yogi Rowan M.D.        SODIUM BICARBONATE 8.4 % IV SOLN             MIDAZOLAM HCL 5 MG/5ML INJ SOLN (WRAPPER)             iohexol (OMNIPAQUE) 350 mg/mL  1-300 mL Intra-arterial Once Mark Martinez M.D.        midazolam (Versed)  injection 5 mg  5 mg Intravenous Once Yogi Rowan M.D.        VERAPAMIL HCL 2.5 MG/ML IV SOLN             HEPARIN SODIUM (PORCINE) 1000 UNIT/ML INJ SOLN             LIDOCAINE HCL 2 % INJ SOLN             HEPARIN (PORCINE) IN NACL 2000-0.9 UNIT/L-% IV SOLN             NITROGLYCERIN 2 MG IV SOLN             doxycycline (VIBRAMYCIN) 100 mg in  mL IVPB  100 mg Intravenous Q12HRS Yogi Rowan M.D.           Fluids  No intake or output data in the 24 hours ending 12/13/22 1605    Laboratory  Recent Labs     12/13/22  1418 12/13/22  1515   ISTATAPH 7.192* 7.320*   ISTATAPCO2 51.8* 31.1   ISTATAPO2 102* 89*   ISTATATCO2 21 17*   SAUKDAS9FMK 96 96   ISTATARTHCO3 19.9 16.0*   ISTATARTBE -9* -9*   ISTATTEMP 37.8 C 37.9 C   ISTATFIO2 100 60   ISTATSPEC Arterial Arterial   ISTATAPHTC 7.181* 7.307*   FQLOBNUA4YJ 107* 94*     Recent Labs     12/13/22  0139 12/13/22  0325   SODIUM 137 139   POTASSIUM 3.6 4.2   CHLORIDE 98 102   CO2 20 22   BUN 22 23*   CREATININE 1.36 1.30   CALCIUM 9.3 9.3     Recent Labs     12/13/22  0139 12/13/22  0325 12/13/22  1150   ALTSGPT 45 46  --    ASTSGOT 68* 63*  --    ALKPHOSPHAT 110* 101*  --    TBILIRUBIN 0.7 0.7  --    LIPASE 43  --   --    GLUCOSE 341* 197* 259*     Recent Labs     12/13/22  0139 12/13/22  0325 12/13/22  0340   WBC 19.5*  --  16.5*   NEUTSPOLYS 83.00*  --  87.30*   LYMPHOCYTES 8.40*  --  5.10*   MONOCYTES 7.10  --  6.00   EOSINOPHILS 0.10  --  0.10   BASOPHILS 0.70  --  0.70   ASTSGOT 68* 63*  --    ALTSGPT 45 46  --    ALKPHOSPHAT 110* 101*  --    TBILIRUBIN 0.7 0.7  --      Recent Labs     12/13/22  0139 12/13/22  0340 12/13/22  0600   RBC 4.96 5.07  --    HEMOGLOBIN 15.0 15.4  --    HEMATOCRIT 45.9 46.0  --    PLATELETCT 307 283  --    PROTHROMBTM 13.7 13.3 14.3   APTT 27.7 22.2*  --    INR 1.06 1.02 1.13       Imaging  X-Ray:  I have personally reviewed the images and compared with prior images. and My impression is: Pulmonary  edema    Assessment/Plan      Cardiogenic shock   Pulmonary artery catheter and arterial catheter placed by me on 12/13   Initial cardiac index 1.1   I am titrating an epinephrine drip based upon serial determinations of hemodynamics   Emergently going to the cardiac catheterization laboratory for Impella placement    Acute systolic heart failure   Echocardiogram with LVEF of 30 to 35%   Differential diagnosis of etiology includes myocarditis, stress-induced cardiomyopathy, possible ischemic cardiomyopathy   Continue full anticoagulation with heparin drip with anti-Xa monitoring    Acute hypoxemic respiratory failure   Emergently intubated on 12/13   ABCDEF bundle   Not a candidate for SAT/SBT   Mobility level 1    Query coexisting pneumonia   She has leukocytosis with an elevated procalcitonin (for what it's worth)   Negative influenza, RSV and SARS-CoV-2   Continue empiric ceftriaxone and doxycycline    Hyperglycemia   Glycohemoglobin 5.8   Sliding scale insulin    Left adrenal mass   Further evaluation down the road when clinically able    Prolonged QTc   Avoid all QT prolonging medications   Check magnesium      VTE:  Lovenox  Ulcer: PPI  Lines: Central Line  Ongoing indication addressed, Arterial Line  Ongoing indication addressed, and Culp Catheter  Ongoing indication addressed    I have performed a physical exam and reviewed and updated ROS and Plan today (12/13/2022). In review of yesterday's note (12/12/2022), there are no changes except as documented above.     I have assessed and reassessed her respiratory status with ventilator adjustments, airway mechanics, ventilator waveforms, blood pressure, hemodynamics, cardiovascular status with titration of vasopressors and inotropic medications and her neurologic status.  She is at high risk for worsening respiratory and cardiovascular system dysfunction.150    Discussed patient condition and risk of morbidity and/or mortality with RN and RT    The patient  remains critically ill.  Critical care time = 150 minutes in directly providing and coordinating critical care and extensive data review.  No time overlap and excludes procedures.    The time spent is in addition of the time spent by Dr. Singh earlier today.    A Critical Care Medicine progress note may have been authored by a resident physician or advanced practitioner of nursing under my direct supervision on this date of service.  As the supervising and attending physician, I have either attested to or cosigned that document.  IN THE EVENT THAT DISCREPANCIES EXIST BETWEEN THIS DOCUMENT AND ANY DOCUMENT THAT I HAVE ATTESTED TO OR COSIGNED ON THIS DATE OF SERVICE, THEN THIS DOCUMENT REMAINS THE FINAL AUTHORITY AS TO MY ASSESSMENT AND PLAN REGARDING THE CARE OF THIS PATIENT.    Yogi Rowan MD  Pulmonary and Critical Care Medicine

## 2022-12-13 NOTE — ED NOTES
Pt c/o nausea and increasing sob, pt diaphoretic cool and pale. Rapid response called and admitting physician called

## 2022-12-13 NOTE — ASSESSMENT & PLAN NOTE
Pt was made DNR/DNI at ED upon discussion with patient. Though I'm not sure whether pt understands it fully given her resp distress and pt also received benadryl  Need further discussion regarding goal of care.   I briefly talked to niece who's trying to gather family members including DPOA

## 2022-12-13 NOTE — ED PROVIDER NOTES
ED Provider Note    Scribed for Darren Marshall by Mark Do. 12/13/2022  3:13 AM    Primary care provider: Ivy Latham M.D.  Means of arrival: EMS  History obtained from: Patient  History limited by: None    CHIEF COMPLAINT  Chief Complaint   Patient presents with    Chest Pain     Left side chest pain at 2100 with SOB. Pt transferred from [facility] for NSTEMI. 800 troponin, +n/v      HPI  Samina Walsh is a 72 y.o. female who presents to the Emergency Department via EMS as a transfer from [facility] for further evaluation of chest pain onset tonight. She states that she was at a party when she began feeling some chest pain but thought it was indigestion so she took an antacid. She then went home and went to bed, but her pain persisted and began to radiate throughout her chest. She also began to develop shortness of breath, which prompted her to present to [facility] ED. She notes associated nausea, vomiting, lightheadedness and dizziness. Earlier her stomach was in pain, and she states that her pain was alleviated from her vomiting. She currently states that she is feeling better, but still endorses some mid chest pain. She has a history of high cholesterol and prediabetes.  Quality: pressuring  Duration: tonight  Severity: moderate  Associated sx: shortness of breath, nausea, vomiting, lightheadedness and dizziness    REVIEW OF SYSTEMS  As above, all other systems reviewed and are negative.   See HPI for further details.     PAST MEDICAL HISTORY   has a past medical history of Anxiety, Arrhythmia, Cancer (HCC), Carotid artery plaque, bilateral, GERD (gastroesophageal reflux disease), Heart murmur, High cholesterol, and Prediabetes.    SURGICAL HISTORY   has a past surgical history that includes other orthopedic surgery; cholecystectomy; hysterectomy robotic (11/12/2009); and cystoscopy (11/12/2009).    SOCIAL HISTORY  Social History     Tobacco Use    Smoking status: Former    Smokeless  tobacco: Never    Tobacco comments:     1 ppd, 10 yrs   Vaping Use    Vaping Use: Never used   Substance Use Topics    Alcohol use: Yes     Comment: 1-2 per week    Drug use: Never      Social History     Substance and Sexual Activity   Drug Use Never     FAMILY HISTORY  History reviewed. No pertinent family history.    CURRENT MEDICATIONS  Home Medications       Reviewed by Celia Gonzalez R.N. (Registered Nurse) on 12/13/22 at 0316  Med List Status: Not Addressed     Medication Last Dose Status   Ascorbic Acid (VITAMIN C) 1000 MG Tab  Active   Calcium-Vitamin D-Vitamin K (CALCIUM + D + K PO)  Active   Cholecalciferol 2000 UNIT Tab  Active   citalopram (CELEXA) 10 MG tablet  Active   famotidine (PEPCID) 20 MG Tab  Active   Magnesium 400 MG Tab  Active   pantoprazole (PROTONIX) 40 MG Tablet Delayed Response  Active   Red Yeast Rice 600 MG Tab  Active   zinc gluconate 50 MG Tab tablet  Active                  ALLERGIES  Allergies   Allergen Reactions    Melatonin Unspecified    Pneumococcal Polysaccharides Swelling       PHYSICAL EXAM    VITAL SIGNS:   Vitals:    12/13/22 0317 12/13/22 0331 12/13/22 0401 12/13/22 0501   BP:  113/65 106/68 138/71   Pulse:  (!) 114 97 (!) 101   Resp:  19 19 20   Temp:       TempSrc:       SpO2: 94% 93% 92% 92%   Weight:       Height:         Vitals: My interpretation: normotensive, tachycardic, afebrile, not hypoxic    Reinterpretation of vitals: Improved    Cardiac Monitor Interpretation: The cardiac monitor revealed normal Sinus Rhythm with tachycardia as interpreted by me. The cardiac monitor was ordered secondary to the patient's history of chest pain, NSTEMI and to monitor for dysrhythmia and/or tachycardia.    PE:   Constitutional: Well developed, Well nourished, No acute distress, Non-toxic appearance.   HENT: Normocephalic, Atraumatic, Bilateral external ears normal, Oropharynx is clear mucous membranes are moist. No oral exudates or nasal discharge.   Eyes: Pupils are equal  round and reactive, EOMI, Conjunctiva normal, No discharge.   Neck: Normal range of motion, No tenderness, Supple, No stridor. No meningismus.  Lymphatic: No lymphadenopathy noted.   Cardiovascular: Tachycardic. Regular rhythm without murmur rub or gallop.  Thorax & Lungs: Clear breath sounds bilaterally without wheezes, rhonchi or rales. There is no reproducible chest wall tenderness.   Abdomen: Soft non-tender non-distended. There is no rebound or guarding. No organomegaly is appreciated. Bowel sounds are normal.  Skin: Normal without rash.   Back: No CVA or spinal tenderness.   Extremities: Intact distal pulses, No edema, No tenderness, No cyanosis, No clubbing. Capillary refill is less than 2 seconds.  Musculoskeletal: Good range of motion in all major joints. No tenderness to palpation or major deformities noted.   Neurologic: Alert & oriented x 3, Normal motor function, Normal sensory function, No focal deficits noted. Reflexes are normal.  Psychiatric: Affect normal, Judgment normal, Mood normal. There is no suicidal ideation or patient reported hallucinations.     DIAGNOSTIC STUDIES / PROCEDURES    LABS  Results for orders placed or performed during the hospital encounter of 12/13/22   CBC WITH DIFFERENTIAL   Result Value Ref Range    WBC 16.5 (H) 4.8 - 10.8 K/uL    RBC 5.07 4.20 - 5.40 M/uL    Hemoglobin 15.4 12.0 - 16.0 g/dL    Hematocrit 46.0 37.0 - 47.0 %    MCV 90.7 81.4 - 97.8 fL    MCH 30.4 27.0 - 33.0 pg    MCHC 33.5 (L) 33.6 - 35.0 g/dL    RDW 45.0 35.9 - 50.0 fL    Platelet Count 283 164 - 446 K/uL    MPV 11.5 9.0 - 12.9 fL    Neutrophils-Polys 87.30 (H) 44.00 - 72.00 %    Lymphocytes 5.10 (L) 22.00 - 41.00 %    Monocytes 6.00 0.00 - 13.40 %    Eosinophils 0.10 0.00 - 6.90 %    Basophils 0.70 0.00 - 1.80 %    Immature Granulocytes 0.80 0.00 - 0.90 %    Nucleated RBC 0.00 /100 WBC    Neutrophils (Absolute) 14.40 (H) 2.00 - 7.15 K/uL    Lymphs (Absolute) 0.84 (L) 1.00 - 4.80 K/uL    Monos (Absolute)  0.99 (H) 0.00 - 0.85 K/uL    Eos (Absolute) 0.02 0.00 - 0.51 K/uL    Baso (Absolute) 0.11 0.00 - 0.12 K/uL    Immature Granulocytes (abs) 0.13 (H) 0.00 - 0.11 K/uL    NRBC (Absolute) 0.00 K/uL   COMP METABOLIC PANEL   Result Value Ref Range    Sodium 139 135 - 145 mmol/L    Potassium 4.2 3.6 - 5.5 mmol/L    Chloride 102 96 - 112 mmol/L    Co2 22 20 - 33 mmol/L    Anion Gap 15.0 7.0 - 16.0    Glucose 197 (H) 65 - 99 mg/dL    Bun 23 (H) 8 - 22 mg/dL    Creatinine 1.30 0.50 - 1.40 mg/dL    Calcium 9.3 8.5 - 10.5 mg/dL    AST(SGOT) 63 (H) 12 - 45 U/L    ALT(SGPT) 46 2 - 50 U/L    Alkaline Phosphatase 101 (H) 30 - 99 U/L    Total Bilirubin 0.7 0.1 - 1.5 mg/dL    Albumin 4.3 3.2 - 4.9 g/dL    Total Protein 7.7 6.0 - 8.2 g/dL    Globulin 3.4 1.9 - 3.5 g/dL    A-G Ratio 1.3 g/dL   LACTIC ACID   Result Value Ref Range    Lactic Acid 4.8 (HH) 0.5 - 2.0 mmol/L   aPTT   Result Value Ref Range    APTT 22.2 (L) 24.7 - 36.0 sec   Prothrombin Time   Result Value Ref Range    PT 13.3 12.0 - 14.6 sec    INR 1.02 0.87 - 1.13   Heparin Xa (Unfractionated)   Result Value Ref Range    Heparin Xa (UFH) <0.10 IU/mL   TROPONIN   Result Value Ref Range    Troponin T 1147 (H) 6 - 19 ng/L   ESTIMATED GFR   Result Value Ref Range    GFR (CKD-EPI) 43 (A) >60 mL/min/1.73 m 2   EKG (NOW)   Result Value Ref Range    Report       [facility] Emergency Dept.    Test Date:  2022  Pt Name:    DELANEY RING                  Department: ER  MRN:        [id number]                      Room:       RD 02  Gender:     Female                       Technician: [id number]  :        1950                   Requested By:BRIAN BAKER  Order #:    [id number]                    Reading MD: Brian Baker    Measurements  Intervals                                Axis  Rate:       103                          P:          53  VT:         121                          QRS:        48  QRSD:       85                           T:           74  QT:         345  QTc:        452    Interpretive Statements  Sinus tachycardia  Probable left atrial enlargement  Borderline low voltage, extremity leads  Compared to ECG 2022 02:14:45  Sinus arrhythmia no longer present  Ventricular premature complex(es) no longer present  T-wave abnormality no longer present  Electronically Signed On 2022 5:24:58 PST Scott Baker     EKG   Result Value Ref Range    Report       [facility] Emergency Dept.    Test Date:  2022  Pt Name:    DELANEY RING                  Department: ER  MRN:        [id number]                      Room:        02  Gender:     Female                       Technician: [id number]  :        1950                   Requested By:BRIAN BAKER  Order #:    [id number]                    Reading MD: Brian Baker    Measurements  Intervals                                Axis  Rate:       113                          P:          49  MO:         117                          QRS:        42  QRSD:       84                           T:          102  QT:         325  QTc:        446    Interpretive Statements  Fast sinus arrhythmia  Probable left atrial enlargement  Low voltage, extremity leads  Nonspecific T abnormalities, lateral leads  Compared to ECG 2022 04:01:03  T-wave abnormality now present  Sinus tachycardia no longer present  Electronically Signed On 2022 6:14:56 PST by Brian Baker         All labs reviewed by me. Labs were compared to prior labs if they were available. Leukocytosis 16, no anemia, normal electrolytes, mildly hyperglycemic, no renal failure, slight AST predominant transaminitis that is mild, bilirubin is normal, lactic is 4.8, down from 6.2 a prior hospital, PT and INR normal, troponin increased to 1147 from 870    RADIOLOGY  CT-CTA COMPLETE THORACOABDOMINAL AORTA   Final Result         1.  No aortic aneurysm or dissection identified   2.  New left adrenal mass,  given size and vascularity concerning for neoplastic lesion. Follow-up MRI of the adrenals recommended for further characterization.   3.  Hepatomegaly with irregular hepatic contour favoring changes of cirrhosis   4.  Pulmonary edema and/or infiltrate.   5.  Mild atherosclerosis with atherosclerotic coronary artery disease   6.  Right apical pulmonary nodule, see nodule follow-up recommendations below. There is also left pulmonary nodule was not calcification favoring granuloma.      Fleischner Society pulmonary nodule recommendations:      Low Risk: CT at 6-12 months, then consider CT at 18-24 months      High Risk: CT at 6-12 months, then CT at 18-24 months      Low Risk - Minimal or absent history of smoking and of other known risk factors.      High Risk - History of smoking or of other known risk factors.      Note: These recommendations do not apply to lung cancer screening, patients with immunosuppression, or patients with known primary cancer.      Fleischner Society 2017 Guidelines for Management of Incidentally Detected Pulmonary Nodules in Adults      These findings were discussed with the patient's clinician, Darren Marshall, on 12/13/2022 5:24 AM.      US-RENAL    (Results Pending)   EC-ECHOCARDIOGRAM COMPLETE W/O CONT    (Results Pending)     The radiologist's interpretation of all radiological studies have been reviewed by me. I compared imaging to previous images, if they were available.     COURSE & MEDICAL DECISION MAKING  Nursing notes, VS, PMSFHx, labs, imaging, EKG reviewed in chart.    Heart Score: High     MDM: 3:13 AM Samina Walsh is a 72 y.o. female who presented with NSTEMI and lab derangements from [facility].  Patient was a transfer by Dr. Smith.  Dr. Judd of cardiology is consulting on the case.  Patient is fairly healthy, active, exercises regularly.  Last evening she developed what she thought was indigestion and nausea and vomiting and went to the ED.  EKG was normal but her  troponin was elevated around 870.  Her work-up was also concerning for leukocytosis of 19,000, lactic acidosis of 6, hypoxia requiring supplemental oxygen.  Cardiology recommended transfer here in case she may need catheterization but also asked for CT aorta evaluation considering derangements and no ST elevation as well as heparinization.  Patient was already in transport by the time they took a call from cardiology regarding the CT aorta and this was ordered here upon the patient's arrival as well as a repeat troponin and EKG.  Heparin running.  Patient is stable hemodynamically.  Resting comfortably.  Chest pain is very mild.  Her physical exam is fairly benign.  She is borderline hypoxic requiring oxygen.  Her CT aorta showed no aneurysm or pulmonary embolism that was appreciated there is a left adrenal mass that they recommend getting an MRI on, but otherwise shows some possible pulmonary infiltrate/edema.  Considering her sepsis appearing labs, patient started on empiric antibiotics that she does states she has a history of bacterial pneumonia requiring hospitalization 6 years ago.  Serial EKGs x3 are all without signs of acute MI.  Her repeat troponin does show elevation of 1147 but lactic acidosis is improving.  Antibiotics are running.  Discussed with hospitalist who will admit patient for continued cardiology consultation and monitoring.    CRITICAL CARE TIME 32 minutes: Management of lactic acidosis, sepsis, leukocytosis, pneumonia, NSTEMI, consultation with specialist, frequent reevaluations  There was a very real possibility of deterioration of the patient's condition.  This patient required the highest level of care.  I provided critical care services which included: review of the medical record, treatment orders, ordering and reviewing test results, frequent reevaluation of the patient's condition and response to treatment, as well as discussing the case with appropriate personnel and various  consultants. The critical care time associated with the care of this patient is exclusive of any procedures or specific interventions.    FINAL IMPRESSION  1. NSTEMI (non-ST elevated myocardial infarction) (HCC) Acute   2. Adrenal mass (HCC) Acute   3. Chest pain due to myocardial ischemia, unspecified ischemic chest pain type Acute   4. Community acquired pneumonia, unspecified laterality Acute   5. Lactic acidosis Acute   6. Leukocytosis, unspecified type       IMark (Scribe), am scribing for, and in the presence of, Darren Marshall.    Electronically signed by: Mark Do (Scribe), 12/13/2022    IDarren personally performed the services described in this documentation, as scribed by Mark Do in my presence, and it is both accurate and complete.    The note accurately reflects work and decisions made by me.  Darren Marshall  12/13/2022  6:15 AM

## 2022-12-13 NOTE — CONSULTS
Cardiology Initial Consultation    Date of Service  12/13/2022    Referring Physician  Karen Alvarez M.D.    Reason for Consultation  Positive troponins and chest pain    History of Presenting Illness  Samina Walsh is a 72 y.o. female with a past medical history of GERD who presented 12/13/2022 with feeling unwell post dinner.  1 alcoholic beverage.  Had some burning chest pain.  Also nausea and some vomiting.  No constitutional symptoms.  Minimal shortness of breath.  Seen in [facility].  Transferred to [facility] with elevated troponins.  Currently with sinus tachycardia.  Weak but without chest pain or shortness of breath.  Overall healthy.  Exercises.  Does not smoke.  Minimal alcohol use.  Single.  No children.  Works as a realtor.  No previous cardiac history except for some palpitations with a negative work-up.  Normal echocardiogram in the past.    Review of Systems  Review of Systems   Constitutional:  Positive for fatigue. Negative for chills and fever.   HENT:  Negative for congestion.    Eyes:  Negative for pain and discharge.   Respiratory:  Negative for cough and wheezing.    Cardiovascular:  Positive for chest pain. Negative for palpitations.   Gastrointestinal:  Positive for nausea and vomiting. Negative for abdominal pain.   Musculoskeletal:  Negative for myalgias.   Skin:  Negative for rash.   Hematological:  Does not bruise/bleed easily.   Psychiatric/Behavioral:  The patient is not nervous/anxious.    All other systems reviewed and are negative.    Past Medical History   has a past medical history of Anxiety, Arrhythmia, Cancer (HCC), Carotid artery plaque, bilateral, GERD (gastroesophageal reflux disease), Heart murmur, High cholesterol, and Prediabetes.    Surgical History   has a past surgical history that includes other orthopedic surgery; cholecystectomy; hysterectomy robotic (11/12/2009); and cystoscopy (11/12/2009).    Family History  family history is not on file.    Social  History   reports that she has quit smoking. She has never used smokeless tobacco. She reports current alcohol use. She reports that she does not use drugs.    Medications  Prior to Admission Medications   Prescriptions Last Dose Informant Patient Reported? Taking?   Ascorbic Acid (VITAMIN C) 1000 MG Tab   Yes No   Sig: Take 1 Tablet by mouth every day.   Calcium-Vitamin D-Vitamin K (CALCIUM + D + K PO)   Yes No   Sig: Take  by mouth.   Cholecalciferol 2000 UNIT Tab   Yes No   Sig: Take 1 Tablet by mouth every day.   Magnesium 400 MG Tab   Yes No   Sig: Take 1 Tablet by mouth every day.   Red Yeast Rice 600 MG Tab   Yes No   Si tablet Orally BID   citalopram (CELEXA) 10 MG tablet   Yes No   Sig: Take 1 Tablet by mouth every 48 hours.   famotidine (PEPCID) 20 MG Tab   Yes No   Sig: Take 20 mg by mouth 2 times a day.   pantoprazole (PROTONIX) 40 MG Tablet Delayed Response   Yes No   Si tablet Orally QOD   zinc gluconate 50 MG Tab tablet   Yes No   Sig: Take 1 Tablet by mouth every day.      Facility-Administered Medications: None       Allergies  Allergies   Allergen Reactions    Melatonin Unspecified    Pneumococcal Polysaccharides Swelling       Vital signs in last 24 hours  Temp:  [36.3 °C (97.4 °F)-36.7 °C (98 °F)] 36.7 °C (98 °F)  Pulse:  [] 101  Resp:  [17-30] 20  BP: ()/(54-95) 138/71  SpO2:  [80 %-94 %] 92 %    Physical Exam  Physical Exam  Vitals reviewed.   Constitutional:       General: She is not in acute distress.     Appearance: She is well-developed. She is not diaphoretic.   Eyes:      Conjunctiva/sclera: Conjunctivae normal.   Neck:      Thyroid: No thyroid mass or thyromegaly.      Vascular: No JVD.      Trachea: No tracheal deviation.   Cardiovascular:      Rate and Rhythm: Regular rhythm. Tachycardia present.      Heart sounds: Murmur heard.      Comments: S3  Pulmonary:      Effort: Pulmonary effort is normal. No respiratory distress.      Breath sounds: Normal breath sounds.    Chest:      Chest wall: No tenderness.   Abdominal:      Palpations: Abdomen is soft.      Tenderness: There is no abdominal tenderness.   Musculoskeletal:         General: Normal range of motion.   Skin:     General: Skin is warm and dry.   Neurological:      Mental Status: She is alert and oriented to person, place, and time.      Motor: No tremor.   Psychiatric:         Behavior: Behavior normal.       Lab Review  Lab Results   Component Value Date/Time    WBC 16.5 (H) 12/13/2022 03:40 AM    RBC 5.07 12/13/2022 03:40 AM    HEMOGLOBIN 15.4 12/13/2022 03:40 AM    HEMATOCRIT 46.0 12/13/2022 03:40 AM    MCV 90.7 12/13/2022 03:40 AM    MCH 30.4 12/13/2022 03:40 AM    MCHC 33.5 (L) 12/13/2022 03:40 AM    MPV 11.5 12/13/2022 03:40 AM      Lab Results   Component Value Date/Time    SODIUM 139 12/13/2022 03:25 AM    POTASSIUM 4.2 12/13/2022 03:25 AM    CHLORIDE 102 12/13/2022 03:25 AM    CO2 22 12/13/2022 03:25 AM    GLUCOSE 197 (H) 12/13/2022 03:25 AM    BUN 23 (H) 12/13/2022 03:25 AM    CREATININE 1.30 12/13/2022 03:25 AM      Lab Results   Component Value Date/Time    ASTSGOT 63 (H) 12/13/2022 03:25 AM    ALTSGPT 46 12/13/2022 03:25 AM     Lab Results   Component Value Date/Time    CHOLSTRLTOT 255 (H) 11/30/2022 03:00 PM     (H) 11/30/2022 03:00 PM    HDL 55 11/30/2022 03:00 PM    TRIGLYCERIDE 134 11/30/2022 03:00 PM    TROPONINT 1147 (H) 12/13/2022 03:25 AM       Recent Labs     12/13/22  0139   NTPROBNP 322*       Cardiac Imaging and Procedures Review  EKG:  My personal interpretation of the EKG dated 12/13/2022 is sinus rhythm.  Nonspecific ST-T wave changes.    Echocardiogram: Pending.  Previously normal.    Cardiac Catheterization: Possibly today.    Imaging  Chest X-Ray: Interstitial edema or infiltrates  CT adrenal mass      Assessment/Plan  No new Assessment & Plan notes have been filed under this hospital service since the last note was generated.  Service: Cardiac Electrophysiology  1.  Acute  coronary syndrome with elevated troponins.  Minimally abnormal EKG.  Recommendation stat echocardiogram.  Possible cardiac catheterization today.  2.  Continue aspirin, heparin, statins.  3.  Elevated white count and lactic acid.  Placed on antibiotics.  4.  Mild congestive heart failure.  Echo pending.  5.  Elevated glucose.  6.  Mild renal insufficiency.    Thank you for allowing me to participate in the care of this patient.        Please contact me with any questions.    Yogi Judd M.D.   Cardiologist, [facility]  [phone]

## 2022-12-13 NOTE — DISCHARGE PLANNING
SASHA contacted sister Patrica ([phone]) to inform her of the patient's decision regarding her medical care. Sister also spoke with patient about sending someone to the hospital to be with her. Sister called back and stated that Juju will come to the hospital.

## 2022-12-13 NOTE — ED NOTES
Lab called with critical result of lactic acid 4.8 at 0420. Critical lab result read back to .   Dr. welch notified of critical lab result at 0420.

## 2022-12-13 NOTE — ED PROVIDER NOTES
ED Provider Note    CHIEF COMPLAINT  Chief Complaint   Patient presents with    Chest Pain     Pt arrives to the ER via POV with friend. Pt complains of chest pain, shortness of breath, N/V and high epigastric pain.  Onset just PTA.  Advised NPO until seen by provider. Pt verbalizes understanding.        Shortness of Breath    N/V       HPI  Samina Walsh is a 72 y.o. female who presents with chest pain & shortness of breath.  Patient states that she had an otherwise normal day went to exercise and went to work as usual.  She went to a [employer] party this evening and had one alcoholic drink and some 'rich food'.  She she said that this was around 7 PM. She came home around 9 and felt quite unwell initially thought it was indigestion and she took some famotidine without any relief.  She tried to go to bed but that her pain persisted.  She had a small amount of emesis at home and felt suddenly short of breath and had a pressure-like chest sensation.  There was associated nausea, lightheadedness and dizziness.  It was moderate in severity.  It is throughout her chest but not into her back.  Had an episode of emesis on arrival to the ER and reports significant improvement in her pain since then.  She is denying any pain to me.  She denies any history of PE, DVT, malignancy, unilateral leg swelling, smoking, recent period of immobilization including hospitalization, long plane or car ride.  He denies any ripping or tearing nature of the pain.  Denies any cardiac risk factors to me however I reviewed the patient's chart notable for prediabetes, high cholesterol, history of NSVT and carotid artery plaque. She denies any known family history.    REVIEW OF SYSTEMS  As per HPI, otherwise a 10 point review of systems is negative    PAST MEDICAL HISTORY  Past Medical History:   Diagnosis Date    Anxiety     Arrhythmia     Cancer (HCC)     squamous cell carcinoma removed    Carotid artery plaque, bilateral     GERD  "(gastroesophageal reflux disease)     Heart murmur     High cholesterol     Prediabetes        SOCIAL HISTORY  Social History     Tobacco Use    Smoking status: Former    Smokeless tobacco: Never    Tobacco comments:     1 ppd, 10 yrs   Vaping Use    Vaping Use: Never used   Substance Use Topics    Alcohol use: Yes     Comment: 1-2 per week    Drug use: Never       SURGICAL HISTORY  Past Surgical History:   Procedure Laterality Date    HYSTERECTOMY ROBOTIC  11/12/2009    Performed by GEORGES CRUMP at SURGERY [facility]    CYSTOSCOPY  11/12/2009    Performed by GEORGES CRUMP at SURGERY [facility]    CHOLECYSTECTOMY      OTHER ORTHOPEDIC SURGERY      rt ankle fx       CURRENT MEDICATIONS  Home Medications       Reviewed by Bridgett Byrne R.N. (Registered Nurse) on 12/13/22 at 0157  Med List Status: Complete     Medication Last Dose Status   Ascorbic Acid (VITAMIN C) 1000 MG Tab 12/12/2022 Active   Calcium-Vitamin D-Vitamin K (CALCIUM + D + K PO) 12/12/2022 Active   Cholecalciferol 2000 UNIT Tab 12/12/2022 Active   citalopram (CELEXA) 10 MG tablet 12/12/2022 Active   famotidine (PEPCID) 20 MG Tab 12/12/2022 Active   Magnesium 400 MG Tab 12/12/2022 Active   pantoprazole (PROTONIX) 40 MG Tablet Delayed Response 12/12/2022 Active   Red Yeast Rice 600 MG Tab 12/12/2022 Active   zinc gluconate 50 MG Tab tablet 12/12/2022 Active                    ALLERGIES  Allergies   Allergen Reactions    Melatonin Unspecified    Pneumococcal Polysaccharides Swelling       PHYSICAL EXAM  VITAL SIGNS: BP (!) 86/54   Pulse (!) 107   Temp 36.3 °C (97.4 °F) (Temporal)   Resp (!) 26   Ht 1.6 m (5' 3\")   Wt 69.1 kg (152 lb 5.4 oz)   SpO2 94%   BMI 26.99 kg/m²    Constitutional: Awake and alert . Ill appearing & Pale  HENT: Normal inspection, no trauma  Eyes: Normal inspection  Neck: Grossly normal range of motion.  Cardiovascular: Tachycardic heart rate, Normal rhythm.  Symmetric peripheral pulses.   Thorax & Lungs: " On NC, No respiratory distress, No wheezing, No rales, No rhonchi, No chest tenderness.   Abdomen: Soft, non-distended, nontender, no mass  Skin: No obvious rash.  Back: No tenderness, No CVA tenderness.   Extremities: Warm, well perfused. No clubbing, cyanosis, edema,   Neurologic: Grossly normal   Psychiatric: Normal for situation    RADIOLOGY/PROCEDURES  DX-CHEST-PORTABLE (1 VIEW)   Final Result         1.  Interstitial edema and/or infiltrates.           Imaging is interpreted by radiologist    Labs:  Results for orders placed or performed during the hospital encounter of 12/13/22   CBC WITH DIFFERENTIAL   Result Value Ref Range    WBC 19.5 (H) 4.8 - 10.8 K/uL    RBC 4.96 4.20 - 5.40 M/uL    Hemoglobin 15.0 12.0 - 16.0 g/dL    Hematocrit 45.9 37.0 - 47.0 %    MCV 92.5 81.4 - 97.8 fL    MCH 30.2 27.0 - 33.0 pg    MCHC 32.7 (L) 33.6 - 35.0 g/dL    RDW 44.8 35.9 - 50.0 fL    Platelet Count 307 164 - 446 K/uL    MPV 11.2 9.0 - 12.9 fL    Neutrophils-Polys 83.00 (H) 44.00 - 72.00 %    Lymphocytes 8.40 (L) 22.00 - 41.00 %    Monocytes 7.10 0.00 - 13.40 %    Eosinophils 0.10 0.00 - 6.90 %    Basophils 0.70 0.00 - 1.80 %    Immature Granulocytes 0.70 0.00 - 0.90 %    Nucleated RBC 0.00 /100 WBC    Neutrophils (Absolute) 16.21 (H) 2.00 - 7.15 K/uL    Lymphs (Absolute) 1.64 1.00 - 4.80 K/uL    Monos (Absolute) 1.38 (H) 0.00 - 0.85 K/uL    Eos (Absolute) 0.02 0.00 - 0.51 K/uL    Baso (Absolute) 0.14 (H) 0.00 - 0.12 K/uL    Immature Granulocytes (abs) 0.13 (H) 0.00 - 0.11 K/uL    NRBC (Absolute) 0.00 K/uL   COMP METABOLIC PANEL   Result Value Ref Range    Sodium 137 135 - 145 mmol/L    Potassium 3.6 3.6 - 5.5 mmol/L    Chloride 98 96 - 112 mmol/L    Co2 20 20 - 33 mmol/L    Anion Gap 19.0 (H) 7.0 - 16.0    Glucose 341 (H) 65 - 99 mg/dL    Bun 22 8 - 22 mg/dL    Creatinine 1.36 0.50 - 1.40 mg/dL    Calcium 9.3 8.4 - 10.2 mg/dL    AST(SGOT) 68 (H) 12 - 45 U/L    ALT(SGPT) 45 2 - 50 U/L    Alkaline Phosphatase 110 (H) 30 - 99  U/L    Total Bilirubin 0.7 0.1 - 1.5 mg/dL    Albumin 4.4 3.2 - 4.9 g/dL    Total Protein 7.8 6.0 - 8.2 g/dL    Globulin 3.4 1.9 - 3.5 g/dL    A-G Ratio 1.3 g/dL   LIPASE   Result Value Ref Range    Lipase 43 7 - 58 U/L   TROPONIN   Result Value Ref Range    Troponin T 853 (H) 6 - 19 ng/L   proBrain Natriuretic Peptide, NT   Result Value Ref Range    NT-proBNP 322 (H) 0 - 125 pg/mL   D-DIMER   Result Value Ref Range    D-Dimer Screen 0.34 0.00 - 0.50 ug/mL (FEU)   LACTIC ACID   Result Value Ref Range    Lactic Acid 6.2 (HH) 0.5 - 2.0 mmol/L   ESTIMATED GFR   Result Value Ref Range    GFR (CKD-EPI) 41 (A) >60 mL/min/1.73 m 2   aPTT   Result Value Ref Range    APTT 27.7 24.7 - 36.0 sec   Prothrombin Time   Result Value Ref Range    PT 13.7 12.0 - 14.6 sec    INR 1.06 0.87 - 1.13   Heparin Xa (Unfractionated)   Result Value Ref Range    Heparin Xa (UFH) <0.10 IU/mL   EKG   Result Value Ref Range    Report       [facility] Emergency Dept.    Test Date:  2022  Pt Name:    DELANEY RING                  Department: [facility]  MRN:        [id number]                      Room:       [place] [address]  Gender:     Female                       Technician: [id number]  :        1950                   Requested By:VICKY BOB  Order #:    [id number]                    Reading MD: Darren Marshall    Measurements  Intervals                                Axis  Rate:       108                          P:          70  ND:         124                          QRS:        65  QRSD:       85                           T:          67  QT:         341  QTc:        457    Interpretive Statements  Sinus tachycardia  Probable left atrial enlargement  Low voltage, precordial leads  Compared to ECG 2020 09:46:29  Low QRS voltage now present  Sinus bradycardia no longer present  Electronically Signed On 2022 5:25:18 PST by Darren Marshall     EKG   Result Value Ref Range    Report       [facility] Emergency Dept.    Test Date:  2022  Pt Name:    DELANEY RING                  Department: [facility]  MRN:        [id number]                      Room:       -ROOM 6  Gender:     Female                       Technician: [id number]  :        1950                   Requested By:VICKY BOB  Order #:    [id number]                    Reading MD: Darren Marshall    Measurements  Intervals                                Axis  Rate:       108                          P:          36  KY:         116                          QRS:        47  QRSD:       84                           T:          91  QT:         360  QTc:        483    Interpretive Statements  SINUS ARRHYTHMIA, RATE    VENTRICULAR PREMATURE COMPLEX  NONSPECIFIC T ABNORMALITIES, LATERAL LEADS  Compared to ECG 2022 01:14:26  Ventricular premature complex(es) now present  T-wave abnormality now present  Sinus tachycardia no longer present  Electronically Signed On 2022 5:25:1 3 PST by Darren Marshall       The EKG was reviewed by me and interpreted as documented above      Medications   ondansetron (ZOFRAN) syringe/vial injection 4 mg (4 mg Intravenous Given 22 0145)   aspirin (ASA) chewable tab 324 mg (324 mg Oral Given 22 0230)       COURSE & MEDICAL DECISION MAKING    This is a 72-year-old female who presents with chest pain, shortness of breath and emesis.  On arrival she is tachycardic, hypoxic and overall ill-appearing. She does not appear to be grossly volume overloaded. She had an episode of emesis on arrival to the emergency department but denied any ongoing chest pain to me.  Differential includes ACS, PE, aortic dissection, pneumonia, sepsis, dehydration gastroenteritis among others.  Patient placed on telemetry.  Stat EKG obtained without any ST deviations. Chest x-ray without evidence of widened mediastinum though she does have pulmonary infiltrate versus edema.  Labs notable for troponin 853,  lactate 6.2, no anemia she is also hyperglycemic with an elevated anion gap.  I considered pulmonary embolism however unlikely with a D-dimer of less than 0.5.  I also considered or aortic dissection however no known history of hypertension, no neurological deficit, equal pulses no ripping or tearing nature of her pain.    2.16  Paged Dr. Judd (Cardiology)   I explained to him in detail the patient's history, exam and work-up in the emergency department.  He agreed with treatment with aspirin and heparin and transferred to [facility] given need for potential intervention.  She did not require emergent cardiac catheterization transfer at this time given no STEMI on EKG (repeat EKG again unchanged).  Patient was accepted for transfer by Dr. Marshall to [facility].      Regarding her leukocytosis and lactic acidosis and need for sepsis so she has no clear source, ?pulmonary.  She has signs to suggest volume overload at this time and I felt fluids would be detrimental at this time. I do not see a clear indication to start antibiotics.     Just prior to transfer, I was notified by Dr. Judd that he was specifically concerned about the possibility of an aortic dissection and requested a CTA however the ambulance had already arrived at this time.  I discussed with Dr. Marshall at [facility] the need to obtain this on arrival and he expressed understanding.  She was transferred in guarded condition.       Critical care time : Samina presents with an acute critical illness and in my judgement had significant potential for imminent deterioration. I provided 40 minutes of Critical Care time to this patient, exclusive of any separately billable procedures. This included bedside direction of care, frequent re-evaluations, time spent coordinating ongoing consultant care and time of medical documentation.       FINAL IMPRESSION  1. Lactic acidosis        2. Leukocytosis, unspecified  type        3. NSTEMI (non-ST elevated myocardial infarction) (HCC)        4. Unstable angina pectoris (HCC)                This dictation was created using voice recognition software. The accuracy of the dictation is limited to the abilities of the software.  The nursing notes were reviewed and certain aspects of this information were incorporated into this note.      Electronically signed by: Coral Smith M.D., 12/13/2022 1:43 AM

## 2022-12-13 NOTE — PROGRESS NOTES
Cardiology followup   Have seen and evaluated the patient in the ER with Major Singh DO, intensivist and in the CCU with Yogi Noble MD  Since admission in the ER the patient has clinically deteriorated with acute respiratory failure with hypoxia requiring BiPAP, uncontrolled hypertension, and elevated heart rate to 170s with sinus tachycardia. IV NTG and IV furosemide started, IV heparin continued with initial improvement of heart rate, BP. Stat echocardiogram showed LVEF 35% with global hypokinesis with apical sparing. Since admission to ICU work of breathing has increased, BP has dropped, and continues with severe respiratory failure on BiPAP.     PE  BP 98/50    GEN: awake tachypneic, denies chest pain, on BiPAP  Lungs: diffuse wheezes, rales, rhonchi  Cardiac: rapid RRR  Ext: cool, no edema, 1-2+ pulses    EKGs, echo images, CXR, lab all reviewed personally and with intensivists    Assessment:  Acute HFrEF  Acute cardiomyopathy  Impending cardiogenic shock  ARF with hypoxia requiring BiPAP  Metabolic, lactic acidosis    Recommendations Discussion  Etiology of acute HFrEF cardiomyopathy most likely myocarditis or stress myopathy variant, less likely ACS  Code status changed after discussion with Dr Noble, patient, family; patient agrees with this.  To be intubated  Needs hemodynamic monitoring with SG catheter  Inotropic support with dobutamine, vasopressors as needed  Minimize or discontinue NTG to avoid hypotension  Continue IV heparin  Reviewed and discussed with Dr Martinez interventional cardiology for urgent LHC +/- impella support  I had a lengthy discussion with the patient's niece concerning the serious nature of the patient's cardiac condition    This patient is critically ill.  I have spent a total of 50 minutes providing care for this patient. No time overlap. Procedures were not included in this time. This time was spent at the bedside evaluating the patient, the patient's chart,  labs, imaging, physical exam, and have I personally reviewed the patient's treatment plan with ruslan Lauren Ebner., formulating assessment and plan.

## 2022-12-13 NOTE — ASSESSMENT & PLAN NOTE
Suspect cardiac event vs myocarditis given elevated troponin, chest pain  On heparin gtt   Serial troponins  Cardiology following  To have LHC at some point. Time TBD

## 2022-12-13 NOTE — ED NOTES
Patient presents to the ED for shortness of breath and intermittent chest pain. Patient states that she was eating dinner and experienced her symptoms shortly after. Pt was 85% on RA and was put on 4 liters of 02 then her oxygen saturation came up to 92% in triage. Patient is nauseas at this time but has not vomited. Pt states no prior medical hx.

## 2022-12-13 NOTE — PROCEDURES
Date of service:  12/13/2022    Title:  Arterial catheter placement - axillary artery    Indication: Acute systolic heart failure    Narrative:    A time out was performed identifying the correct patient, correct procedure and correct location for this procedure prior to performing this procedure.    The left arm was prepped with chlorhexidine and draped in the usual sterile fashion.  A 20 gauge arterial catheter was placed into the left axillary artery under ultrasound guidance using the technique described by Promise without difficulty or apparent complication.  The guidewire was removed intact.  The line was sutured into place and a sterile dressing was placed over the line.  An outstanding arterial waveform is present on the monitor.  The patient tolerated the procedure quite nicely.  No complications are apparent.      Yogi Rowan MD  Pulmonary and Critical Care Medicine

## 2022-12-13 NOTE — H&P
Hospital Medicine History & Physical Note    Date of Service  12/13/2022    Primary Care Physician  Ivy Latham M.D.    Consultants  Cardiology (Dr. Judd)    Code Status  Full Code    Chief Complaint  Chief Complaint   Patient presents with    Chest Pain     Left side chest pain at 2100 with SOB. Pt transferred from [facility] for NSTEMI. 800 troponin, +n/v        History of Presenting Illness  Samina Walsh is a 72 y.o. female who presented 12/13/2022 to [facility] ER with chest pain, found to have NSTEMI and transferred to [facility] ED for higher level care.  Patient reported attending a birthday party, reported to have sudden onset of chest pain associated shortness of breath.  She thought she was having acid reflux, reported no relief with famotidine intake.  She subsequently developed shortness of breath, became concerned and EMS was called.  At [facility] ER, she was found to have troponin T of 853, nonspecific ST changes on EKG.  Cardiology was consulted, recommended CTA  abdomen which did not reveal any dissection or PE seen.  Patient was started on heparin drip, transferred to [facility] ED for higher level care.  She was seen by me in ER.  Admitted to medicine service for further evaluation and treatment.    I discussed the plan of care with patient and bedside RN.    Review of Systems  Review of Systems   Constitutional:  Negative for chills and fever.   HENT:  Negative for hearing loss and tinnitus.    Eyes:  Negative for blurred vision and double vision.   Respiratory:  Positive for cough and shortness of breath.    Cardiovascular:  Positive for chest pain. Negative for leg swelling.   Gastrointestinal:  Positive for heartburn, nausea and vomiting. Negative for abdominal pain.   Genitourinary:  Negative for dysuria and hematuria.   Musculoskeletal:  Negative for joint pain and myalgias.   Neurological:  Positive for weakness. Negative for dizziness, focal weakness and headaches.    Endo/Heme/Allergies:  Negative for environmental allergies. Does not bruise/bleed easily.   Psychiatric/Behavioral:  Negative for depression and substance abuse.    All other systems reviewed and are negative.    Past Medical History   has a past medical history of Anxiety, Arrhythmia, Cancer (HCC), Carotid artery plaque, bilateral, GERD (gastroesophageal reflux disease), Heart murmur, High cholesterol, and Prediabetes.    Surgical History   has a past surgical history that includes other orthopedic surgery; cholecystectomy; hysterectomy robotic (2009); and cystoscopy (2009).     Family History  family history is not on file.   Family history reviewed with patient. There is no family history that is pertinent to the chief complaint.     Social History   reports that she has quit smoking. She has never used smokeless tobacco. She reports current alcohol use. She reports that she does not use drugs.    Allergies  Allergies   Allergen Reactions    Melatonin Unspecified    Pneumococcal Polysaccharides Swelling       Medications  Prior to Admission Medications   Prescriptions Last Dose Informant Patient Reported? Taking?   Ascorbic Acid (VITAMIN C) 1000 MG Tab   Yes No   Sig: Take 1 Tablet by mouth every day.   Calcium-Vitamin D-Vitamin K (CALCIUM + D + K PO)   Yes No   Sig: Take  by mouth.   Cholecalciferol 2000 UNIT Tab   Yes No   Sig: Take 1 Tablet by mouth every day.   Magnesium 400 MG Tab   Yes No   Sig: Take 1 Tablet by mouth every day.   Red Yeast Rice 600 MG Tab   Yes No   Si tablet Orally BID   citalopram (CELEXA) 10 MG tablet   Yes No   Sig: Take 1 Tablet by mouth every 48 hours.   famotidine (PEPCID) 20 MG Tab   Yes No   Sig: Take 20 mg by mouth 2 times a day.   pantoprazole (PROTONIX) 40 MG Tablet Delayed Response   Yes No   Si tablet Orally QOD   zinc gluconate 50 MG Tab tablet   Yes No   Sig: Take 1 Tablet by mouth every day.      Facility-Administered Medications: None       Physical  Exam  Temp:  [36.3 °C (97.4 °F)-36.7 °C (98 °F)] 36.7 °C (98 °F)  Pulse:  [] 101  Resp:  [17-30] 20  BP: ()/(54-95) 138/71  SpO2:  [80 %-94 %] 92 %  Blood Pressure : 116/63   Temperature: 36.7 °C (98 °F)   Pulse: (!) 112   Respiration: 17   Pulse Oximetry: 94 %       Physical Exam  Vitals and nursing note reviewed.   Constitutional:       Appearance: Normal appearance.   HENT:      Head: Normocephalic and atraumatic.      Nose: Nose normal.      Mouth/Throat:      Mouth: Mucous membranes are dry.      Pharynx: Oropharynx is clear.   Eyes:      General: No scleral icterus.     Extraocular Movements: Extraocular movements intact.   Cardiovascular:      Rate and Rhythm: Normal rate and regular rhythm.      Pulses: Normal pulses.      Heart sounds:     No friction rub.   Pulmonary:      Effort: No respiratory distress.      Breath sounds: No stridor. No wheezing or rales.   Abdominal:      General: There is no distension.      Palpations: Abdomen is soft.      Tenderness: There is no abdominal tenderness. There is no guarding or rebound.   Musculoskeletal:         General: No swelling or tenderness. Normal range of motion.      Cervical back: Neck supple. No tenderness.   Skin:     General: Skin is warm and dry.      Capillary Refill: Capillary refill takes less than 2 seconds.   Neurological:      General: No focal deficit present.      Mental Status: She is alert and oriented to person, place, and time.   Psychiatric:         Mood and Affect: Mood normal.       Laboratory:  Recent Labs     12/13/22 0139 12/13/22  0340   WBC 19.5* 16.5*   RBC 4.96 5.07   HEMOGLOBIN 15.0 15.4   HEMATOCRIT 45.9 46.0   MCV 92.5 90.7   MCH 30.2 30.4   MCHC 32.7* 33.5*   RDW 44.8 45.0   PLATELETCT 307 283   MPV 11.2 11.5     Recent Labs     12/13/22  0139 12/13/22  0325   SODIUM 137 139   POTASSIUM 3.6 4.2   CHLORIDE 98 102   CO2 20 22   GLUCOSE 341* 197*   BUN 22 23*   CREATININE 1.36 1.30   CALCIUM 9.3 9.3     Recent Labs      12/13/22 0139 12/13/22  0325   ALTSGPT 45 46   ASTSGOT 68* 63*   ALKPHOSPHAT 110* 101*   TBILIRUBIN 0.7 0.7   LIPASE 43  --    GLUCOSE 341* 197*     Recent Labs     12/13/22 0139 12/13/22  0340   APTT 27.7 22.2*   INR 1.06 1.02     Recent Labs     12/13/22 0139   NTPROBNP 322*         Recent Labs     12/13/22 0139 12/13/22  0325   TROPONINT 853* 1147*       Imaging:  CT-CTA COMPLETE THORACOABDOMINAL AORTA   Final Result         1.  No aortic aneurysm or dissection identified   2.  New left adrenal mass, given size and vascularity concerning for neoplastic lesion. Follow-up MRI of the adrenals recommended for further characterization.   3.  Hepatomegaly with irregular hepatic contour favoring changes of cirrhosis   4.  Pulmonary edema and/or infiltrate.   5.  Mild atherosclerosis with atherosclerotic coronary artery disease   6.  Right apical pulmonary nodule, see nodule follow-up recommendations below. There is also left pulmonary nodule was not calcification favoring granuloma.      Fleischner Society pulmonary nodule recommendations:      Low Risk: CT at 6-12 months, then consider CT at 18-24 months      High Risk: CT at 6-12 months, then CT at 18-24 months      Low Risk - Minimal or absent history of smoking and of other known risk factors.      High Risk - History of smoking or of other known risk factors.      Note: These recommendations do not apply to lung cancer screening, patients with immunosuppression, or patients with known primary cancer.      Fleischner Society 2017 Guidelines for Management of Incidentally Detected Pulmonary Nodules in Adults      These findings were discussed with the patient's clinician, Darren Marshall, on 12/13/2022 5:24 AM.      US-RENAL    (Results Pending)   EC-ECHOCARDIOGRAM COMPLETE W/O CONT    (Results Pending)   EC-ECHOCARDIOGRAM COMPLETE W/O CONT    (Results Pending)       X-Ray:  I have personally reviewed the images and compared with prior images.  EKG:  I have  personally reviewed the images and compared with prior images.    Assessment/Plan:  Justification for Admission Status  I anticipate this patient will require at least two midnights of hospitalization, therefore appropriate for inpatient status.      * NSTEMI (non-ST elevated myocardial infarction) (HCC)- (present on admission)  Assessment & Plan  Trend CE, EKG  No dissection or PE seen on CTA  ASA/statin  Heparin drip  Follow-up echo  Cardiology follow-up appreciated    Unstable angina (HCC)  Assessment & Plan  As noted NSTEMI    Lactic acidosis  Assessment & Plan  Severe sepsis  IVF  IV thiamine    Acute respiratory failure with hypoxia (HCC)  Assessment & Plan  On 6 L-wean off as tolerated  No PE seen on CTA  Heparin GTT for NSTEMI  Antibiotic for severe sepsis thought to be secondary to pneumonia  F/u echo    Leukocytosis  Assessment & Plan  Trend  IVF, abx    Severe sepsis (HCC)  Assessment & Plan  This is Sepsis Present on admission  SIRS criteria identified on my evaluation include: Leukocytosis, with WBC greater than 12,000 and Bandemia, greater than 10% bands  Source is probable pulm  Sepsis protocol initiated  Fluid resuscitation ordered per protocol  Crystalloid Fluid Administration: Fluid resuscitation ordered per standard protocol - 30 mL/kg per current or ideal body weight  IV antibiotics as appropriate for source of sepsis  Reassessment: I have reassessed the patient's hemodynamic status    WBC 19.5k, LA 6.8      GERD (gastroesophageal reflux disease)  Assessment & Plan  PPI    Pneumonia- (present on admission)  Assessment & Plan  Suspected pneumonia versus aspiration given nausea vomiting  Empiric antibiotic: Ceftriaxone, azithromycin  Follow sputum culture, de-escalate as appropriate    Hyperglycemia  Assessment & Plan  ISS  F/u HbA1c      VTE prophylaxis: Heparin drip

## 2022-12-13 NOTE — PROCEDURES
Date of service:  12/13/2022    Title: Pulmonary artery catheter placement    Indication: Acute systolic heart failure    Narrative:    A time out was performed identifying the correct patient, correct procedure and correct location prior to this procedure.    The left chest was prepped with chlorhexidine and draped in the usual sterile fashion.  An 8.5 Italian central venous introducer sheath was present in the left subclavian vein.  I floated a [place] pulmonary artery catheter into the pulmonary artery wedge position using continuous pressure waveform analysis without difficulty or apparent complication.  The pulmonary artery catheter was secured into place and a sterile dressing was placed over the catheter.  The patient tolerated the procedure quite nicely.  No complications were apparent.  Hemodynamic measurements are pending.      Yogi Rowan MD  Pulmonary and Critical Care Medicine

## 2022-12-13 NOTE — ED NOTES
Assumed care, report received.  Pt resting quietly on gurney, expresses no needs at this time.  Updated on POC

## 2022-12-13 NOTE — ASSESSMENT & PLAN NOTE
This is Sepsis Present on admission  SIRS criteria identified on my evaluation include: Leukocytosis, with WBC greater than 12,000 and Bandemia, greater than 10% bands  Source is probable pulm  Sepsis protocol initiated  Fluid resuscitation ordered per protocol  Crystalloid Fluid Administration: Fluid resuscitation ordered per standard protocol - 30 mL/kg per current or ideal body weight  IV antibiotics as appropriate for source of sepsis  Reassessment: I have reassessed the patient's hemodynamic status    WBC 19.5k, LA 6.8

## 2022-12-13 NOTE — ASSESSMENT & PLAN NOTE
On 6 L-wean off as tolerated  No PE seen on CTA  Heparin GTT for NSTEMI  Antibiotic for severe sepsis thought to be secondary to pneumonia  F/u echo    Developed flash pulmonary edema, placed on BIPAP and nitro gtt, transferred to ICU under their care

## 2022-12-13 NOTE — ED TRIAGE NOTES
Chief Complaint   Patient presents with    Chest Pain     Pt arrives to the ER via POV with friend. Pt complains of chest pain, shortness of breath, N/V and high epigastric pain.  Onset just PTA.  Advised NPO until seen by provider. Pt verbalizes understanding.        Shortness of Breath    N/V

## 2022-12-13 NOTE — ASSESSMENT & PLAN NOTE
Trend CE, EKG  No dissection or PE seen on CTA  ASA/statin  Heparin drip  Follow-up echo  Cardiology follow-up appreciated    Admitted to ICU for respiratory failure and cardiogenic shock, cardiology and ICU team managing

## 2022-12-13 NOTE — ED TRIAGE NOTES
"Chief Complaint   Patient presents with    Chest Pain     Left side chest pain at 2100 with SOB. Pt transferred from [facility] for NSTEMI. 800 troponin, +n/v      Pt BIB [facility] as a transfer from [facility]. Pt was at a party tonight when she developed CP and SOB. Pt states she had a drink and thought it was acid reflux. She continued to have CP and SOB.    /63   Pulse (!) 112   Temp 36.7 °C (98 °F) (Temporal)   Resp 17   Ht 1.6 m (5' 3\")   Wt 69.1 kg (152 lb 5.4 oz)   SpO2 94%   BMI 26.99 kg/m²     "

## 2022-12-13 NOTE — DISCHARGE PLANNING
HTH/SCP chart review completed. Collaborated with ED  Helga. Note, due to medical acuity, pt is now requiring ICU level of care ( per Bedside RN). TCN services are not indicated at this time and therefore will defer to hospital CM. TCN will monitor pt for transfer to another floor or can be reached by [employer] if collaboration with the hospital discharge team is indicated. Thank you!

## 2022-12-13 NOTE — ASSESSMENT & PLAN NOTE
Cardiogenic pulmonary edema in the setting of acute decompensated systolic CHF.   LVEF moderately-severely reduced.   On BIPAP 16/10 at 100%.   Received Lasix 40mg IV x1 at ED.     Plan:   Continue BIPAP   Rule out COVID/Influenza/RSV. Full panel RVP, CRP.  Isolate patietn pending result  Ceftriaxone and azithromycin for empiric CAP  Procalcitonin  Serial lactates  Will give another lasix 40mg IV x1 (total 80)  Monitor UOP, strict I/Os  Goal negative fluid balance in next 24 hours.

## 2022-12-13 NOTE — ED NOTES
Lab from Lab called with critical result of Lactic Acid 6.2 and Troponin 853 at 0212. Critical lab result read back to Lab.   Dr. Smith notified of critical lab result at 0212.  Critical lab result read back by Dr. Smith.

## 2022-12-13 NOTE — PROCEDURES
Date of service:  12/13/2022    Title:  Emergent endotracheal intubation    Indication:  Respiratory failure    Narrative:    A time out was performed identifying the correct patient, correct procedure and correct location prior to this procedure.    The patient was sedated and paralyzed with 20 mg of IV etomidate and 60 mg of IV rocuronium.  A 7.5 [place] Islander endotracheal tube was placed directly into the trachea using a #3 Glidescope without difficulty or apparent complication.  The CO2 detector made the appropriate color change, bilateral symmetrical breath sounds are present and fogging is present in the endotracheal tube.  All of this confirms that the endotracheal tube is indeed in the patient's trachea.  The patient tolerated the procedure quite nicely.  No complications are apparent.      Yogi Rowan MD  Pulmonary and Critical Care Medicine

## 2022-12-13 NOTE — ASSESSMENT & PLAN NOTE
Suspected pneumonia versus aspiration given nausea vomiting  Empiric antibiotic: Ceftriaxone, azithromycin  Follow sputum culture, de-escalate as appropriate

## 2022-12-13 NOTE — ASSESSMENT & PLAN NOTE
LVEF 30-35%, with global hypokinesis. RV size/function normal. Moderate MR  ECG with no obvious ST-T wave elevations. Nonspecific flatenning T wave  Concerning of possible myocarditis vs CAD.   Pt is nonsmoker and fairly active person per family.   Lasix 40mg IV x1 given    Continue nitro gtt and wean down to chest pain free  Continue heparin gtt  Serial troponins.   Continue BIPAP  Will give another lasix 40mg IV x1.   Goal negative fluid balance  CRP, full panel RVP panel

## 2022-12-13 NOTE — PROCEDURES
Date of service:  12/13/2022    Title:  Central venous catheter placement - subclavian vein    Indication: Acute systolic heart failure    Narrative:    A time out was performed identifying the correct patient, correct procedure and correct location prior to this procedure.    The left chest was prepped with chlorhexidine and draped in the usual sterile fashion.  1% Xylocaine solution was used for topical anesthesia.  An 8.5 Finnish central venous catheter was placed into the left subclavian vein under ultrasound guidance using the technique described by Promise without difficulty or apparent complication.  The guidewire was removed intact.  The line was sutured into place and a sterile dressing was placed over the line.  All ports flush and return venous blood easily.  The patient tolerated the procedure quite nicely.  No complications are apparent.  A STAT CXR is ordered to confirm placement.  The catheter may be safely used for infusion and medication administration.      Yogi Rowan MD  Pulmonary and Critical Care Medicine

## 2022-12-13 NOTE — H&P
Critical Care History & Physical Note    Date of Service  12/13/2022    Referral Physician  Dr. Thomas, [facility]    Code Status  Full Code    Chief Complaint  Chief Complaint   Patient presents with    Chest Pain     Left side chest pain at 2100 with SOB. Pt transferred from [facility] for NSTEMI. 800 troponin, +n/v        History of Presenting Illness  Samina Walsh is a 72 y.o.  female, who's an active person, nonsmoker, nonIVDA, no known CAD who presented to ED for chest pain and SOB. Pt did have a [place] party the evening prior and only had one glass of drink. Nothing too exciting. At ED, pt was found to be hypoxic, increased WOB requiring BIPAP. CXR with bilateral pulm edema. CTA chest with negative PE and bilateral interlobular septal thickening and GGO. ECG with no ST-T wave elevation but troponin elevated in 1100-1400s. Bedside TTE with moderately-severely reduced LVEF, normal RV size/function. Moderate MR. Was given nitro 0.4mg, followed with nitro gtt. Heparin gtt started ASA given. Pt was started on BIPAP and tolerating. Given lasix 40mg IV x1.   Also code status was addressed at ED and reportedly DNR/DNI.       Review of Systems  Review of Systems   Reason unable to perform ROS: limited due to mental status.   All other systems reviewed and are negative.    Past Medical History   has a past medical history of Anxiety, Arrhythmia, Cancer (HCC), Carotid artery plaque, bilateral, GERD (gastroesophageal reflux disease), Heart murmur, High cholesterol, and Prediabetes.    Surgical History   has a past surgical history that includes other orthopedic surgery; cholecystectomy; hysterectomy robotic (11/12/2009); and cystoscopy (11/12/2009).     Family History  family history is not on file.   Family history reviewed with patient. There is family history that is pertinent to the chief complaint.     Social History   reports that she has quit smoking. She has never used smokeless tobacco. She  reports current alcohol use. She reports that she does not use drugs.    Allergies  Allergies   Allergen Reactions    Melatonin Unspecified    Pneumococcal Polysaccharides Swelling       Medications  Prior to Admission Medications   Prescriptions Last Dose Informant Patient Reported? Taking?   Ascorbic Acid (VITAMIN C) 1000 MG Tab 12/12/2022 at AM Patient Yes No   Sig: Take 1 Tablet by mouth every day.   Calcium-Vitamin D-Vitamin K (CALCIUM + D + K PO) 12/12/2022 at AM Patient Yes No   Sig: Take  by mouth.   Cholecalciferol 2000 UNIT Tab 12/12/2022 at AM Patient Yes No   Sig: Take 1 Tablet by mouth every day.   Magnesium 400 MG Tab 12/12/2022 at K Patient Yes No   Sig: Take 1 Tablet by mouth every day.   citalopram (CELEXA) 10 MG tablet 12/12/2022 at AM Patient Yes No   Sig: Take 1 Tablet by mouth every 48 hours.   famotidine (PEPCID) 20 MG Tab 12/12/2022 at [facility] Patient Yes No   Sig: Take 20 mg by mouth 2 times a day.   pantoprazole (PROTONIX) 40 MG Tablet Delayed Response 12/12/2022 at AM Patient Yes No   Sig: Take 40 mg by mouth as needed.   zinc gluconate 50 MG Tab tablet 12/12/2022 at [facility] Patient Yes No   Sig: Take 1 Tablet by mouth every day.      Facility-Administered Medications: None       Physical Exam  Temp:  [36.3 °C (97.4 °F)-36.7 °C (98 °F)] 36.7 °C (98 °F)  Pulse:  [] 150  Resp:  [17-41] 40  BP: ()/(54-95) 137/79  SpO2:  [78 %-99 %] 99 %  Blood Pressure : 137/79   Temperature: 36.7 °C (98 °F)   Pulse: (!) 150   Respiration: (!) 40   Pulse Oximetry: 99 %       Physical Exam  Vitals and nursing note reviewed.   Constitutional:       General: She is in acute distress.      Appearance: She is ill-appearing and toxic-appearing. She is not diaphoretic.   HENT:      Head: Normocephalic.      Mouth/Throat:      Mouth: Mucous membranes are dry.   Eyes:      Pupils: Pupils are equal, round, and reactive to light.   Cardiovascular:      Rate and Rhythm: Normal rate and regular rhythm.      Pulses:  Normal pulses.      Heart sounds: Normal heart sounds. No murmur heard.  Pulmonary:      Effort: No respiratory distress.      Breath sounds: Normal breath sounds. No wheezing, rhonchi or rales.      Comments: On BIPAP  Abdominal:      General: There is no distension.      Palpations: Abdomen is soft.      Tenderness: There is no abdominal tenderness. There is no guarding.   Musculoskeletal:      Cervical back: Neck supple.      Right lower leg: No edema.      Left lower leg: No edema.   Skin:     Capillary Refill: Capillary refill takes less than 2 seconds.      Coloration: Skin is not jaundiced.      Findings: No bruising or rash.   Neurological:      Mental Status: She is alert.       Laboratory:  Recent Labs     12/13/22 0139 12/13/22  0340   WBC 19.5* 16.5*   RBC 4.96 5.07   HEMOGLOBIN 15.0 15.4   HEMATOCRIT 45.9 46.0   MCV 92.5 90.7   MCH 30.2 30.4   MCHC 32.7* 33.5*   RDW 44.8 45.0   PLATELETCT 307 283   MPV 11.2 11.5     Recent Labs     12/13/22 0139 12/13/22  0325   SODIUM 137 139   POTASSIUM 3.6 4.2   CHLORIDE 98 102   CO2 20 22   GLUCOSE 341* 197*   BUN 22 23*   CREATININE 1.36 1.30   CALCIUM 9.3 9.3     Recent Labs     12/13/22 0139 12/13/22  0325   ALTSGPT 45 46   ASTSGOT 68* 63*   ALKPHOSPHAT 110* 101*   TBILIRUBIN 0.7 0.7   LIPASE 43  --    GLUCOSE 341* 197*     Recent Labs     12/13/22 0139 12/13/22  0340 12/13/22  0600   APTT 27.7 22.2*  --    INR 1.06 1.02 1.13     Recent Labs     12/13/22 0139 12/13/22  0600   NTPROBNP 322* 1248*     Recent Labs     12/13/22  0600   TRIGLYCERIDE 50   HDL 68   *     Recent Labs     12/13/22 0139 12/13/22  0325 12/13/22  0600   TROPONINT 853* 1147* 1113*       Imaging:  EC-ECHOCARDIOGRAM COMPLETE W/ CONT   Final Result      CT-CTA COMPLETE THORACOABDOMINAL AORTA   Final Result         1.  No aortic aneurysm or dissection identified   2.  New left adrenal mass, given size and vascularity concerning for neoplastic lesion. Follow-up MRI of the adrenals  recommended for further characterization.   3.  Hepatomegaly with irregular hepatic contour favoring changes of cirrhosis   4.  Pulmonary edema and/or infiltrate.   5.  Mild atherosclerosis with atherosclerotic coronary artery disease   6.  Right apical pulmonary nodule, see nodule follow-up recommendations below. There is also left pulmonary nodule was not calcification favoring granuloma.      Fleischner Society pulmonary nodule recommendations:      Low Risk: CT at 6-12 months, then consider CT at 18-24 months      High Risk: CT at 6-12 months, then CT at 18-24 months      Low Risk - Minimal or absent history of smoking and of other known risk factors.      High Risk - History of smoking or of other known risk factors.      Note: These recommendations do not apply to lung cancer screening, patients with immunosuppression, or patients with known primary cancer.      Fleischner Society 2017 Guidelines for Management of Incidentally Detected Pulmonary Nodules in Adults      These findings were discussed with the patient's clinician, Darren Marshall, on 12/13/2022 5:24 AM.      US-RENAL    (Results Pending)   CL-LEFT HEART CATHETERIZATION WITH POSSIBLE INTERVENTION    (Results Pending)       X-Ray:  I have personally reviewed the images and compared with prior images.  CT chest was personally reviewed    Assessment/Plan:  * Acute respiratory failure with hypoxia (HCC)  Assessment & Plan  Cardiogenic pulmonary edema in the setting of acute decompensated systolic CHF.   LVEF moderately-severely reduced.   On BIPAP 16/10 at 100%.   Received Lasix 40mg IV x1 at ED.     Plan:   Continue BIPAP   Rule out COVID/Influenza/RSV. Full panel RVP, CRP.  Isolate patietn pending result  Ceftriaxone and azithromycin for empiric CAP  Procalcitonin  Serial lactates  Will give another lasix 40mg IV x1 (total 80)  Monitor UOP, strict I/Os  Goal negative fluid balance in next 24 hours.     Acute systolic congestive heart failure  ([facility])  Assessment & Plan  LVEF 30-35%, with global hypokinesis. RV size/function normal. Moderate MR  ECG with no obvious ST-T wave elevations. Nonspecific flatenning T wave  Concerning of possible myocarditis vs CAD.   Pt is nonsmoker and fairly active person per family.   Lasix 40mg IV x1 given    Continue nitro gtt and wean down to chest pain free  Continue heparin gtt  Serial troponins.   Continue BIPAP  Will give another lasix 40mg IV x1.   Goal negative fluid balance  CRP, full panel RVP panel      NSTEMI (non-ST elevated myocardial infarction) ([facility])- (present on admission)  Assessment & Plan  Suspect cardiac event vs myocarditis given elevated troponin, chest pain  On heparin gtt   Serial troponins  Cardiology following  To have C at some point. Time TBD    Goals of care, counseling/discussion  Assessment & Plan  Pt was made DNR/DNI at ED upon discussion with patient. Though I'm not sure whether pt understands it fully given her resp distress and pt also received benadryl  Need further discussion regarding goal of care.   I briefly talked to niece who's trying to gather family members including DPOA      VTE prophylaxis: therapeutic anticoagulation with heparin gtt      The patient remains critically ill. Critical care time = 65 mins in directly providing and coordinating critical care and extensive data review. No time overlap and excludes procedures.

## 2022-12-13 NOTE — PROGRESS NOTES
Hospital Medicine Daily Progress Note    Date of Service  12/13/2022    Chief Complaint  Samina Walsh is a 72 y.o. female admitted 12/13/2022 with NSTEMI    Hospital Course  Patient admitted in the ED and had rapid response called on her shortly following her TTE where she developed flash pulmonary edema.  Her heart rate was in the 160s blood pressure in the 170s and she had respiratory distress.  I started on BiPAP, IV nitroglycerin drip, IV Lasix, IV heparin was continuing.  I discussed case with Dr. Judd from cardiology who initially evaluated the patient and he informed me about patient's LVEF 30 to 35% and that Dr. Gonzalez would be the day cardiologist.  BiPAP and nitro initially seem to temporize her status, had a long conversation with her regarding CODE STATUS as she initially reported that she wanted to be full code, when I was discussing that I was concerned about her respirations and she may need intubation she was against this and wanted to be made DNR/DNI.  Ultimately we had a phone conversation with the patient, her sister, myself and bedside RN who felt that patient was of sound mind making her decisions.  I consulted intensivist who evaluated the patient and agreed with ICU admission and who subsequently took over care.      Consultants/Specialty  cardiology and critical care    Code Status  DNAR/DNI    Disposition  Patient is not medically cleared for discharge.   Anticipate discharge to  undetermined .      Review of Systems  Review of Systems   Unable to perform ROS: Acuity of condition      Physical Exam  Temp:  [36.3 °C (97.4 °F)-36.7 °C (98 °F)] 36.7 °C (98 °F)  Pulse:  [] 150  Resp:  [17-41] 40  BP: ()/(54-95) 137/79  SpO2:  [78 %-99 %] 99 %    Physical Exam  Vitals and nursing note reviewed. Exam conducted with a chaperone present.   Constitutional:       General: She is in acute distress.      Appearance: She is ill-appearing.      Comments: 72-year-old female appears stated  age, experiencing respiratory distress, ill-appearing, alert and oriented   HENT:      Head: Normocephalic and atraumatic.      Nose: Nose normal. No rhinorrhea.      Mouth/Throat:      Mouth: Mucous membranes are moist.      Pharynx: Oropharynx is clear.   Eyes:      General: No scleral icterus.     Extraocular Movements: Extraocular movements intact.      Conjunctiva/sclera: Conjunctivae normal.      Pupils: Pupils are equal, round, and reactive to light.   Cardiovascular:      Rate and Rhythm: Regular rhythm. Tachycardia present.      Pulses: Normal pulses.      Comments: Difficult to assess murmur with tachycardia but none heard  Pulmonary:      Effort: Respiratory distress present.      Breath sounds: Rales present.      Comments: Patient with severe tachypnea, increased work of breathing, respiratory distress, accessory muscle usage, diffuse rales  Abdominal:      Palpations: Abdomen is soft.      Tenderness: There is no abdominal tenderness. There is no guarding or rebound.   Musculoskeletal:         General: No tenderness or deformity. Normal range of motion.      Cervical back: Normal range of motion and neck supple. No rigidity.   Skin:     General: Skin is warm and dry.   Neurological:      General: No focal deficit present.      Mental Status: She is alert and oriented to person, place, and time. Mental status is at baseline.      Cranial Nerves: No cranial nerve deficit.      Sensory: No sensory deficit.      Motor: No weakness.      Coordination: Coordination normal.       Fluids  No intake or output data in the 24 hours ending 12/13/22 0933    Laboratory  Recent Labs     12/13/22 0139 12/13/22  0340   WBC 19.5* 16.5*   RBC 4.96 5.07   HEMOGLOBIN 15.0 15.4   HEMATOCRIT 45.9 46.0   MCV 92.5 90.7   MCH 30.2 30.4   MCHC 32.7* 33.5*   RDW 44.8 45.0   PLATELETCT 307 283   MPV 11.2 11.5     Recent Labs     12/13/22  0139 12/13/22  0325   SODIUM 137 139   POTASSIUM 3.6 4.2   CHLORIDE 98 102   CO2 20 22    GLUCOSE 341* 197*   BUN 22 23*   CREATININE 1.36 1.30   CALCIUM 9.3 9.3     Recent Labs     12/13/22  0139 12/13/22  0340 12/13/22  0600   APTT 27.7 22.2*  --    INR 1.06 1.02 1.13         Recent Labs     12/13/22  0600   TRIGLYCERIDE 50   HDL 68   *       Imaging  EC-ECHOCARDIOGRAM LTD W/O CONT   Final Result      DX-ABDOMEN FOR TUBE PLACEMENT   Final Result      Enteric tube tip overlies the gastric body.      DX-CHEST-PORTABLE (1 VIEW)   Final Result      1.  Multiple support devices as described above.      2.  Bilateral perihilar infiltrates      DX-CHEST-FOR LINE PLACEMENT Perform procedure in: Patient's Room   Final Result      1.  [place] catheter tip projects in the right main pulmonary artery.   2.  Satisfactory endotracheal tube.   3.  Mildly improved interstitial edema.      US-RENAL   Final Result      1.  Normal renal ultrasound.   2.  Indeterminate 3.9 cm left adrenal mass. If further evaluation of this finding is desired, consider adrenal protocol CT or MRI for further evaluation.      EC-ECHOCARDIOGRAM COMPLETE W/ CONT   Final Result      CT-CTA COMPLETE THORACOABDOMINAL AORTA   Final Result         1.  No aortic aneurysm or dissection identified   2.  New left adrenal mass, given size and vascularity concerning for neoplastic lesion. Follow-up MRI of the adrenals recommended for further characterization.   3.  Hepatomegaly with irregular hepatic contour favoring changes of cirrhosis   4.  Pulmonary edema and/or infiltrate.   5.  Mild atherosclerosis with atherosclerotic coronary artery disease   6.  Right apical pulmonary nodule, see nodule follow-up recommendations below. There is also left pulmonary nodule was not calcification favoring granuloma.      Fleischner Society pulmonary nodule recommendations:      Low Risk: CT at 6-12 months, then consider CT at 18-24 months      High Risk: CT at 6-12 months, then CT at 18-24 months      Low Risk - Minimal or absent history of smoking and  of other known risk factors.      High Risk - History of smoking or of other known risk factors.      Note: These recommendations do not apply to lung cancer screening, patients with immunosuppression, or patients with known primary cancer.      Fleischner Society 2017 Guidelines for Management of Incidentally Detected Pulmonary Nodules in Adults      These findings were discussed with the patient's clinician, Darren Marshall, on 12/13/2022 5:24 AM.      CL-LEFT HEART CATHETERIZATION WITH POSSIBLE INTERVENTION    (Results Pending)        Assessment/Plan  * NSTEMI (non-ST elevated myocardial infarction) (HCC)- (present on admission)  Assessment & Plan  Trend CE, EKG  No dissection or PE seen on CTA  ASA/statin  Heparin drip  Follow-up echo  Cardiology follow-up appreciated    Acute respiratory failure with hypoxia (HCC)  Assessment & Plan  On 6 L-wean off as tolerated  No PE seen on CTA  Heparin GTT for NSTEMI  Antibiotic for severe sepsis thought to be secondary to pneumonia  F/u echo    Hyperglycemia  Assessment & Plan  ISS  F/u HbA1c    Lactic acidosis  Assessment & Plan  Severe sepsis  IVF  IV thiamine    Leukocytosis  Assessment & Plan  Trend  IVF, abx    Severe sepsis (HCC)  Assessment & Plan  This is Sepsis Present on admission  SIRS criteria identified on my evaluation include: Leukocytosis, with WBC greater than 12,000 and Bandemia, greater than 10% bands  Source is probable pulm  Sepsis protocol initiated  Fluid resuscitation ordered per protocol  Crystalloid Fluid Administration: Fluid resuscitation ordered per standard protocol - 30 mL/kg per current or ideal body weight  IV antibiotics as appropriate for source of sepsis  Reassessment: I have reassessed the patient's hemodynamic status    WBC 19.5k, LA 6.8      GERD (gastroesophageal reflux disease)  Assessment & Plan  PPI    Unstable angina (HCC)  Assessment & Plan  As noted NSTEMI    Pneumonia- (present on admission)  Assessment & Plan  Suspected  pneumonia versus aspiration given nausea vomiting  Empiric antibiotic: Ceftriaxone, azithromycin  Follow sputum culture, de-escalate as appropriate         VTE prophylaxis: SCDs/TEDs and therapeutic anticoagulation with heparin drip    I have performed a physical exam and reviewed and updated ROS and Plan today (12/13/2022). In review of yesterday's note (12/12/2022), there are no changes except as documented above.    Patient is critically ill.   The patient continues to have: Flash pulmonary edema  The vital organ system that is affected is the: Cardiopulmonary  If untreated there is a high chance of deterioration into: Respiratory arrest, cardiogenic shock and death.   The critical care that I am providing today is: Starting BiPAP and treating respiratory failure, nitro drip for flash pulmonary edema, consulting and coordinating care with intensivist, cardiologist, RN/RT/pharmacy team, extensive data review  The critical that has been undertaken is medically complex.   There has been no overlap in critical care time.   Critical Care Time not including procedures: 55 minutes

## 2022-12-14 NOTE — CARE PLAN
The patient is Unstable - High likelihood or risk of patient condition declining or worsening    Shift Goals  Clinical Goals: hemodynamic stability    Progress made toward(s) clinical / shift goals:  unstable      Problem: Hemodynamics  Goal: Patient's hemodynamics, fluid balance and neurologic status will be stable or improve  Outcome: Not Progressing     Problem: Safety - Medical Restraint  Goal: Remains free of injury from restraints (Restraint for Interference with Medical Device)  Outcome: Progressing  Goal: Free from restraint(s) (Restraint for Interference with Medical Device)  Outcome: Progressing     Problem: Pain - Standard  Goal: Alleviation of pain or a reduction in pain to the patient’s comfort goal  Outcome: Progressing

## 2022-12-14 NOTE — PROCEDURES
CPR NOTE:    Patient was rapidly started on ACLS protocol, with CPR and limited interruption while maintaining good depth, recoil, and limit respiration to 12/minute to not prevent CPP and when available EtCO2 was closely monitored for ROSC to limit pulse checks and to continue to monitor for quality of CPR. Pads were placed and constantly monitor rhythm and when available bedside ultrasound is always utilized to evaluate cardiac activity, rule out alternate causes such as hypovolemia, tamponade, ptx, pulmonary emboli/right heart strain, true vs pseudo PEA and cardiac contractility.     Please review nursing code note for specific times of drugs and therapies administered.     See my additional note for further details Annie arrest  RAUL Mathias MD  Critical Care Medicine

## 2022-12-14 NOTE — PROGRESS NOTES
Paged and updated Dr. Restrepo on patients swan numbers, increasing dobutamine and high SVR, and bloody urine output. Continue with dobutamine per MD. Monitor urine output and if continues to get more bloody repeat echo for impella placement.

## 2022-12-14 NOTE — DISCHARGE SUMMARY
[facility] Internal Medicine Death Summary      Attending: Yogi Rowan M.d.  Senior Resident: Dr. Hurtado  Call Back Contact Number: [phone]    Admission Date  12/13/2022    Cause of Death  Cardiac arrest due to Cardiogenic shock due to Takotsubo cardiomyopathy and acute hypoxemic respiratory failure and heart failure with reduced ejection fraction.    Comorbid Conditions at the Time of Death  Principal Problem:    Cardiogenic shock (HCC) POA: Unknown  Active Problems:    Pneumonia POA: Yes    NSTEMI (non-ST elevated myocardial infarction) (HCC) POA: Yes    Unstable angina (HCC) POA: Unknown    GERD (gastroesophageal reflux disease) POA: Unknown    Severe sepsis (HCC) POA: Unknown    Leukocytosis POA: Unknown    Lactic acidosis POA: Unknown    Hyperglycemia POA: Unknown    Acute respiratory failure with hypoxia (HCC) POA: Unknown    Acute systolic congestive heart failure (HCC) POA: Unknown    Goals of care, counseling/discussion POA: Unknown  Resolved Problems:    * No resolved hospital problems. *      History of Presenting Illness and Hospital Course  This is a 72 y.o. female with a past medical history significant for gastroesophageal reflux disease, no known cardiac disease, hypercholesterolemia, prediabetes, and skin cancer who presented as a transfer from [facility] for non-ST elevation myocardial infarction (NSTEMI). She originally presented to the hospital with left-sided chest pain and shortness of breath; in the emergency room, she was found to have acute hypoxic respiratory failure requiring rescue BiPAP. CTA chest was negative for pulmonary embolism. Troponins were greater than 1000s. Bedside transthoracic echocardiogram was positive for moderately to severely reduced ejection fraction with moderate mitral regurgitation. She was given nitroglycerin sublingual, nitroglycerin drip, heparin drip, and furosemide. She was admitted to the ICU for acute hypoxic respiratory failure,  NSTEMI, and acute systolic congestive heart failure. The patient had projectile emesis upon coming to the ICU and was emergently intubated and started on pressors; she went into cardiogenic shock and was started on epinephrine drip with bicarbonate. A central line, pulmonary artery catheter, and arterial catheter were placed. Cardiology was consulted and recommended inotropic support; interventional cardiology was also consulted and the patient underwent coronary catheterization on 12/13 showing cardiogenic shock due to Takotsubo cardiomyopathy with subsequent Impella placement. After placement of Impella, the patient developed a hematoma and her heparin drip was held. She was given Cleviprex due to hypertension. During the evening of 12/13/22, the patient's arterial waveform was concerning briefly for pulsus paradoxus with tachycardia in the 150s. Interventional cardiology was emergently consulted with stat echocardiogram ordered. Around midnight 12/14/22, the patient lost pulse and a CODE BLUE was called; ROSC was achieved with epinephrine and CPR. The patient's niece, present at bedside, stated that the patient was not even sure she wanted to be intubated and that her (the patient's niece's) mother, who is DPOA, found an advanced directive which clearly stated she did NOT want heroic measures. Dr. Mathias discussed the patient's condition with sister via phone and the decision was made to transition to comfort care. The patient was terminally extubated and passed away peacefully with family at bedside at 1:00 AM.      Consultants  cardiology and interventional cardiology    Procedures  Endotracheal intubation, 12/13/22  Left subclavian central line placement, 12/13/22  Left pulmonary artery catheter placement, 12/13/22  Left axillary artery arterial catheter placement, 12/13/22  Cardiac catheterization with Impella CP device placement, 12/13/22    Death Date: 12/14/22   Death Time: 0100         Pronounced By (RN1):  Chayito Logan RN  Pronounced By (RN2): Earlene Cbarera RN

## 2022-12-14 NOTE — DISCHARGE PLANNING
"Medical Social Work    MSW responded to T626 for Code Blue.  Pt is Samina Walsh (: 1950).  MSW contacted pt's sister, Patrica ([phone]) listed in emergency contacts.  Patrica was updated on code and states that she's in [place] and has a flight to [place] tomorrow.  Nursing staff updated SW that other family member is in [facility].  MSW met with pt's \"adopted niece\" Juju ([phone]) who was brought to bedside and updated by Dr. Mathias.  Pt's niece states that pt is a DNR and has an Advanced Directive.  Advanced Directive was reviewed by Dr. Mathias.  Dr. Mathias was able to speak with pt's sister via phone and family has agreed to DNR and comfort care at this time.  Emotional support provided to pt's family.  Pt's niece e-mailed Advanced Directive paperwork to this worker and it has been scanned into pt's chart (media tab under Care Coordination).    "

## 2022-12-14 NOTE — PROGRESS NOTES
Code blue called.  Heart rate 140's, no audible heart tones, Flat arterial waveform with MAP less than 60.

## 2022-12-14 NOTE — PROGRESS NOTES
Decreasing Precedex for its potential effect of temperature.  Increased Dilaudid to maintain sedation with lower doses of Dex.

## 2022-12-14 NOTE — PROGRESS NOTES
Pt responds well but briefly to Epinephrine pushes - arterial waveform and AO waveform pulsatile, , MAP .  Niece at bedside, speaking with pt's sister who changed code status to DNR.  Comfort measures being discussed.

## 2022-12-14 NOTE — CARE PLAN
The patient is Unstable - High likelihood or risk of patient condition declining or worsening         Progress made toward(s) clinical / shift goals:  impella placed, BP more stable      Problem: Safety - Medical Restraint  Goal: Remains free of injury from restraints (Restraint for Interference with Medical Device)  Outcome: Progressing  Goal: Free from restraint(s) (Restraint for Interference with Medical Device)  Outcome: Progressing     Problem: Pain - Standard  Goal: Alleviation of pain or a reduction in pain to the patient’s comfort goal  Outcome: Progressing       Patient is not progressing towards the following goals:

## 2022-12-14 NOTE — PROGRESS NOTES
Dr. Restrepo called RN.  Notified him that MAP is less than 60 and we are preparing to code the patient.

## 2022-12-14 NOTE — PROGRESS NOTES
Overnight progress note  Was called to bedside at 11:50 by RN for concern of patient having nonpulsatile waveform with blood pressure 72/70 on Impella and art line, heart rate 150.     Earlier in the evening patient had pulsatility actually was hypertensive and started on Cleviprex, patient had a fever around 9 PM with heart rate going up to 130s.  At around 11 PM patient's blood pressure dropped and was started on epi Cleviprex stopped, dobutamine had been being titrated up and gone from 7.5-12.5 over the last 2 hours.     Liberal numbers at 9 PM-> 11 PM-> midnight (on 12.5 dobutamine and 3 of epi)  CI 2.3> 1.8> 1.2  Wedge 19> 27> 30  CVP 12> 13> 14  SVR I 3500> 4300> 4600    Patient still febrile 39.9, initiating cooling blanket  Lowered to dobutamine to 7.5  EKG, echo ordered.  Patient with tachycardia without pulsatility  Cardiology was contacted however before being able to discuss At 12:10 blood pressure dropped and then lost pulses.     ROSC after 1 of epi and patient with pulsatile pressures heart rate had come down to 110s blood pressure was 120/80s.  Cardiology at bedside.  Lost pulsatility again and was directed to give push of epi with good response.  Echo showed Impella in correct position.  Did have suction issues so fluids given    Around 15 minutes later patient's heart rate went back up to the 160s, again with no pulsatility and blood pressure 62/60.  Increased epi drip.  Patient's niece was at bedside and noted that patient was not even sure she wanted to be intubated and said that her mom who is DPOA had found patient's advanced directive which clearly states she would not want heroic measures.  Discussed with sister on phone at length what were seeing the fact that despite maximum medical therapy patient's heart continues to stop and she is fully reliant on mechanical intervention, along with what both niece and sister said about her goals of care.  Decision was made to transition to comfort care.   Was premedicated, Impella was stopped medications stopped and patient terminally extubated.  Niece at bedside all questions answered.     Again talk to niece after patient had passed, expressed appropriate empathy.  She thanked team for all of their help.     Total critical care time: 60 minutes    Time of death 1:00 a.m..

## 2022-12-14 NOTE — PROGRESS NOTES
Attention order for soft leg immobilizer. Confirmed with Pt he already has one at this time to use when ready.

## 2022-12-14 NOTE — PROGRESS NOTES
Arterial waveform showing pulsus paradoxus, heart rate 150-160.  Dobutamine increased to 12.5 for last cardiac index of 1.8 and poor arterial waveform.  CXR done per resident order.  Notified Dr. Restrepo of changes.  Impella with flattened Ao placement signal.  Stat Echo ordered per impella protocol.

## 2022-12-14 NOTE — PROGRESS NOTES
Pt arrived back to T626 from cath lab, intubated and restrained, with impella in place via right femoral, tuoy locked at 88 cm. No hematoma noted. Epi and dex gtts infusing.

## 2022-12-14 NOTE — PROCEDURES
"CARDIAC CATHETERIZATION REPORT    REFERRING: Willis Gonzalez M.D.    PROCEDURE PHYSICIAN: Mark Martinez MD, [facility], [facility]  ASSISTANT: None    IMPRESSIONS:  1.  Cardiogenic shock related to Takotsubo cardiomyopathy  2.  Successful placement of Impella CP device  3.  Mild, nonobstructive one-vessel coronary artery disease    Recommendations:  Further recommendations per the rounding team    Pre-procedure diagnosis:  Cardiogenic shock  Post-procedure diagnosis: Same    Procedure performed  Selective coronary angiography  Left heart catheterization  Insertion of Impella CP device    Procedure Description  1. Access: 14 Slovak right femoral artery Micropuncture technique was utilized following local anesthesia with lidocaine.  Fluoroscopic guidance was utilized for femoral access Dynamic ultrasound was utilized to gain access    2. Diagnostic description: The catheter was passed to the central circulation with the aide of J tipped 0.35\" wire. A 6F JR4, 6F JL4, and 6F Pigtail were used to inject the coronary circulation and enter the left ventricle during invasive hemodynamic monitoring. After cardiogenic shock was confirmed an Impella CP device was placed after exchanging for the 14 English sheath in the RFA. The impella was secured in the apex and placement confirmed with adjacent wire technique.     3. Closing: At completion of the procedure the relevant equipment was removed from the body and hemostasis achieved by Sheath secured in place    Findings   Hemodynamics:   Aorta: 110/70 mmHg  LVEDP: 33 mmHg  No significant pullback gradient across the aortic valve    Coronary Anatomy   Left Main: Normal   LAD: Minimal luminal irregularities   LCx: Normal   RCA: Dominant, Normal      Technical Factors  1. Complications: None  2. Estimated Blood Loss: <50 cc  3. Specimens: None  4. Contrast Volume: 26 ml  5. Medications: Heparin 5000 Units      "

## 2022-12-14 NOTE — PROGRESS NOTES
Code called: The patient was undergoing CPR. Impella reincreased from P2 to P9. Second bolus of epi given.   Stat echocardiogram was performed. Impella in place. Left ventricular contractility noted.   IMPRESSION:  Repositioning of the Impella device under echocardiogram guidance.  CPT code 51892.     Ger Restrepo MD,FACC,[facility]

## 2022-12-14 NOTE — PROGRESS NOTES
Hematoma forming at insertion site, manual pressure being held. Dr. Pride at bedside orders to stop heparin gtt and resume when ACT within range with impella protocol, q6hgb ordered. Cath lab Rns coming to bedside to assess.

## 2022-12-14 NOTE — PROGRESS NOTES
Pt became tachycardic and hypertensive, Dobutrex titrated up at 1946 for cardiac index less than 2 and SVR 4600.  Cleviprex started per new order from Dr. Wasserman with good results.  Tachycardia persists, pt febrile.  Tylenol and ice packs given without effect.  SVR steadily decreasing.

## 2022-12-14 NOTE — PROGRESS NOTES
Dr. Mathias at bedside.  Dobutamine decreased to 7.5 from 12.5 per MD.  Heart rate 150-160.  AO waveform flat, MAP decreasing.  Epi gtt restarted.  Notified niece that pt is not responding to treatment and she should come in.

## 2022-12-14 NOTE — PROGRESS NOTES
Patient was received to ICU at 1030 on heparin and nitro drips. She was lethargic but oriented x4. She was tachypneic in the 40s and tachycardic in the 130s. She was pale, cool, clammy, and nauseous. She was on bipap 16/10 and 70%. She had an episode of projectile emesis and this was changed to heated high flow. Dr. Rowan discussed code status and current critical condition with patient and family. The decision was made to intubate and place a PA catheter and a-line.    Patient was intubated at 1239, followed by line placement. At 1430, she began to steeply decompensate. Cardiac output numbers were obtained and dobutamine and norepinephrine drips were started. However, blood pressure continued to fall and push doses of epi were administered.    1435 Epinephrine 30 mcg  1505 Epi 10 mcg  1515 Epi 10 mcg  1521 Epi drip started, dobutamine and levophed off.     ABGs showed acidosis, and at 1515, 2 amps of bicarb were given with improved blood pressure response to pressors.     Patient was beginning to wake up, and was given an additional 5 mg of versed in order to place NG tube and leave floor for impella placement in cath lab. Left floor at 1530.

## 2022-12-14 NOTE — CODE DOCUMENTATION
CPR initiated 0010  0012  1 mg of EPI administered  0012 pulse check, no pulse- PEA, CPR restarted  CPR stopped per cardiologist Lauro, ROSC achieved  impella at P-9 0014   0015 Epi 1 mg administered  500 cc bolus LR per Dr Restrepo

## 2022-12-14 NOTE — PROGRESS NOTES
Cleviprex order received from Dr. Wasserman who requested that we also get input from cardiology.  Call placed to Dr. Restrepo at 1955.  No response from cardiology at the time of this note.  BP well controlled with low dose Cleviprex.

## 2022-12-14 NOTE — PROGRESS NOTES
Dr. Rowan and Dr. Pride updated on swan numbers, orders to wean off epi and then if CI still low initiate dobutamine gtt.

## 2022-12-15 LAB
BACTERIA UR CULT: NORMAL
SIGNIFICANT IND 70042: NORMAL
SITE SITE: NORMAL
SOURCE SOURCE: NORMAL

## 2022-12-18 LAB
BACTERIA BLD CULT: ABNORMAL
BACTERIA BLD CULT: ABNORMAL
BACTERIA BLD CULT: NORMAL
SIGNIFICANT IND 70042: ABNORMAL
SIGNIFICANT IND 70042: NORMAL
SITE SITE: ABNORMAL
SITE SITE: NORMAL
SOURCE SOURCE: ABNORMAL
SOURCE SOURCE: NORMAL

## 2022-12-19 LAB
BACTERIA BLD CULT: NORMAL
BACTERIA BLD CULT: NORMAL
SIGNIFICANT IND 70042: NORMAL
SIGNIFICANT IND 70042: NORMAL
SITE SITE: NORMAL
SITE SITE: NORMAL
SOURCE SOURCE: NORMAL
SOURCE SOURCE: NORMAL